# Patient Record
Sex: FEMALE | Race: WHITE | Employment: OTHER | URBAN - METROPOLITAN AREA
[De-identification: names, ages, dates, MRNs, and addresses within clinical notes are randomized per-mention and may not be internally consistent; named-entity substitution may affect disease eponyms.]

---

## 2017-03-01 PROCEDURE — 82746 ASSAY OF FOLIC ACID SERUM: CPT | Performed by: FAMILY MEDICINE

## 2017-03-01 PROCEDURE — 82607 VITAMIN B-12: CPT | Performed by: FAMILY MEDICINE

## 2017-04-17 PROBLEM — F32.9 REACTIVE DEPRESSION: Status: ACTIVE | Noted: 2017-04-17

## 2017-11-08 PROCEDURE — 82746 ASSAY OF FOLIC ACID SERUM: CPT | Performed by: FAMILY MEDICINE

## 2017-11-08 PROCEDURE — 82607 VITAMIN B-12: CPT | Performed by: FAMILY MEDICINE

## 2018-01-12 PROCEDURE — 82746 ASSAY OF FOLIC ACID SERUM: CPT | Performed by: FAMILY MEDICINE

## 2018-01-12 PROCEDURE — 82607 VITAMIN B-12: CPT | Performed by: FAMILY MEDICINE

## 2018-02-03 PROBLEM — D63.8 ANEMIA OF CHRONIC DISEASE: Status: ACTIVE | Noted: 2018-02-03

## 2018-02-24 ENCOUNTER — HOSPITAL ENCOUNTER (INPATIENT)
Facility: HOSPITAL | Age: 83
LOS: 2 days | Discharge: HOME | DRG: 309 | End: 2018-02-26
Attending: EMERGENCY MEDICINE | Admitting: HOSPITALIST
Payer: MEDICARE

## 2018-02-24 ENCOUNTER — HOSPITAL ENCOUNTER (OUTPATIENT)
Age: 83
Discharge: ANOTHER HOSPITAL | DRG: 309 | End: 2018-02-24
Attending: EMERGENCY MEDICINE
Payer: MEDICARE

## 2018-02-24 ENCOUNTER — APPOINTMENT (OUTPATIENT)
Dept: GENERAL RADIOLOGY | Age: 83
DRG: 309 | End: 2018-02-24
Attending: EMERGENCY MEDICINE
Payer: MEDICARE

## 2018-02-24 VITALS
RESPIRATION RATE: 28 BRPM | HEART RATE: 95 BPM | WEIGHT: 94 LBS | OXYGEN SATURATION: 100 % | DIASTOLIC BLOOD PRESSURE: 63 MMHG | HEIGHT: 63 IN | TEMPERATURE: 99 F | SYSTOLIC BLOOD PRESSURE: 128 MMHG | BODY MASS INDEX: 16.66 KG/M2

## 2018-02-24 DIAGNOSIS — R07.9 ACUTE CHEST PAIN: Primary | ICD-10-CM

## 2018-02-24 DIAGNOSIS — R53.1 WEAKNESS GENERALIZED: ICD-10-CM

## 2018-02-24 DIAGNOSIS — R94.31 ABNORMAL EKG: Primary | ICD-10-CM

## 2018-02-24 LAB
ADENOVIRUS PCR:: NEGATIVE
ALBUMIN SERPL BCP-MCNC: 3.6 G/DL (ref 3.5–4.8)
ALP SERPL-CCNC: 65 U/L (ref 32–100)
ALT SERPL-CCNC: 17 U/L (ref 14–54)
ANION GAP SERPL CALC-SCNC: 7 MMOL/L (ref 0–18)
AST SERPL-CCNC: 21 U/L (ref 15–41)
B PERT DNA SPEC QL NAA+PROBE: NEGATIVE
BASOPHILS # BLD: 0 K/UL (ref 0–0.2)
BASOPHILS NFR BLD: 0 %
BILIRUB DIRECT SERPL-MCNC: 0.1 MG/DL (ref 0–0.2)
BILIRUB SERPL-MCNC: 0.6 MG/DL (ref 0.3–1.2)
BILIRUB UR QL: NEGATIVE
BUN SERPL-MCNC: 14 MG/DL (ref 8–20)
BUN/CREAT SERPL: 20 (ref 10–20)
C PNEUM DNA SPEC QL NAA+PROBE: NEGATIVE
CALCIUM SERPL-MCNC: 9.1 MG/DL (ref 8.5–10.5)
CHLORIDE SERPL-SCNC: 104 MMOL/L (ref 95–110)
CHOLEST SERPL-MCNC: 117 MG/DL (ref 110–200)
CLARITY UR: CLEAR
CO2 SERPL-SCNC: 24 MMOL/L (ref 22–32)
COLOR UR: YELLOW
CORONAVIRUS 229E PCR:: NEGATIVE
CORONAVIRUS HKU1 PCR:: NEGATIVE
CORONAVIRUS NL63 PCR:: NEGATIVE
CORONAVIRUS OC43 PCR:: NEGATIVE
CREAT SERPL-MCNC: 0.7 MG/DL (ref 0.5–1.5)
EOSINOPHIL # BLD: 0 K/UL (ref 0–0.7)
EOSINOPHIL NFR BLD: 0 %
ERYTHROCYTE [DISTWIDTH] IN BLOOD BY AUTOMATED COUNT: 15.4 % (ref 11–15)
FLUAV + FLUBV RNA SPEC NAA+PROBE: NEGATIVE
FLUAV RNA SPEC QL NAA+PROBE: NEGATIVE
FLUBV RNA SPEC QL NAA+PROBE: NEGATIVE
GLUCOSE SERPL-MCNC: 103 MG/DL (ref 70–99)
GLUCOSE UR-MCNC: NEGATIVE MG/DL
HCT VFR BLD AUTO: 29.1 % (ref 35–48)
HDLC SERPL-MCNC: 59 MG/DL
HGB BLD-MCNC: 8.9 G/DL (ref 12–16)
HGB UR QL STRIP.AUTO: NEGATIVE
KETONES UR-MCNC: NEGATIVE MG/DL
LACTATE SERPL-SCNC: 1 MMOL/L (ref 0.5–2.2)
LDLC SERPL CALC-MCNC: 50 MG/DL (ref 0–99)
LYMPHOCYTES # BLD: 0.5 K/UL (ref 1–4)
LYMPHOCYTES NFR BLD: 4 %
MCH RBC QN AUTO: 27.8 PG (ref 27–32)
MCHC RBC AUTO-ENTMCNC: 30.5 G/DL (ref 32–37)
MCV RBC AUTO: 91.3 FL (ref 80–100)
METAPNEUMOVIRUS PCR:: NEGATIVE
MONOCYTES # BLD: 0.6 K/UL (ref 0–1)
MONOCYTES NFR BLD: 5 %
MYCOPLASMA PNEUMONIA PCR:: NEGATIVE
NEUTROPHILS # BLD AUTO: 11.7 K/UL (ref 1.8–7.7)
NEUTROPHILS NFR BLD: 91 %
NITRITE UR QL STRIP.AUTO: NEGATIVE
NONHDLC SERPL-MCNC: 58 MG/DL
OSMOLALITY UR CALC.SUM OF ELEC: 281 MOSM/KG (ref 275–295)
PARAINFLUENZA 1 PCR:: NEGATIVE
PARAINFLUENZA 2 PCR:: NEGATIVE
PARAINFLUENZA 3 PCR:: NEGATIVE
PARAINFLUENZA 4 PCR:: NEGATIVE
PH UR: 5 [PH] (ref 5–8)
PLATELET # BLD AUTO: 212 K/UL (ref 140–400)
PMV BLD AUTO: 8.1 FL (ref 7.4–10.3)
POTASSIUM SERPL-SCNC: 3.7 MMOL/L (ref 3.3–5.1)
PROCALCITONIN SERPL-MCNC: 0.07 NG/ML (ref ?–0.11)
PROT SERPL-MCNC: 6.7 G/DL (ref 5.9–8.4)
PROT UR-MCNC: NEGATIVE MG/DL
RBC # BLD AUTO: 3.19 M/UL (ref 3.7–5.4)
RBC #/AREA URNS AUTO: 1 /HPF
RHINOVIRUS/ENTERO PCR:: NEGATIVE
RSV RNA SPEC QL NAA+PROBE: NEGATIVE
SODIUM SERPL-SCNC: 135 MMOL/L (ref 136–144)
SP GR UR STRIP: 1.02 (ref 1–1.03)
TRIGL SERPL-MCNC: 38 MG/DL (ref 1–149)
TROPONIN I SERPL-MCNC: 0 NG/ML (ref ?–0.03)
TROPONIN I SERPL-MCNC: 0.01 NG/ML (ref ?–0.03)
TROPONIN I SERPL-MCNC: 0.05 NG/ML (ref ?–0.03)
UROBILINOGEN UR STRIP-ACNC: <2
VIT C UR-MCNC: NEGATIVE MG/DL
WBC # BLD AUTO: 12.8 K/UL (ref 4–11)
WBC #/AREA URNS AUTO: 4 /HPF

## 2018-02-24 PROCEDURE — 93005 ELECTROCARDIOGRAM TRACING: CPT

## 2018-02-24 PROCEDURE — 84484 ASSAY OF TROPONIN QUANT: CPT | Performed by: HOSPITALIST

## 2018-02-24 PROCEDURE — 99285 EMERGENCY DEPT VISIT HI MDM: CPT

## 2018-02-24 PROCEDURE — 87798 DETECT AGENT NOS DNA AMP: CPT | Performed by: EMERGENCY MEDICINE

## 2018-02-24 PROCEDURE — 87486 CHLMYD PNEUM DNA AMP PROBE: CPT | Performed by: EMERGENCY MEDICINE

## 2018-02-24 PROCEDURE — 99214 OFFICE O/P EST MOD 30 MIN: CPT

## 2018-02-24 PROCEDURE — 84484 ASSAY OF TROPONIN QUANT: CPT | Performed by: EMERGENCY MEDICINE

## 2018-02-24 PROCEDURE — 87040 BLOOD CULTURE FOR BACTERIA: CPT | Performed by: EMERGENCY MEDICINE

## 2018-02-24 PROCEDURE — 93010 ELECTROCARDIOGRAM REPORT: CPT | Performed by: EMERGENCY MEDICINE

## 2018-02-24 PROCEDURE — 96360 HYDRATION IV INFUSION INIT: CPT

## 2018-02-24 PROCEDURE — 93010 ELECTROCARDIOGRAM REPORT: CPT

## 2018-02-24 PROCEDURE — 84145 PROCALCITONIN (PCT): CPT | Performed by: HOSPITALIST

## 2018-02-24 PROCEDURE — 83605 ASSAY OF LACTIC ACID: CPT | Performed by: EMERGENCY MEDICINE

## 2018-02-24 PROCEDURE — 80048 BASIC METABOLIC PNL TOTAL CA: CPT | Performed by: EMERGENCY MEDICINE

## 2018-02-24 PROCEDURE — 94640 AIRWAY INHALATION TREATMENT: CPT

## 2018-02-24 PROCEDURE — 81001 URINALYSIS AUTO W/SCOPE: CPT | Performed by: EMERGENCY MEDICINE

## 2018-02-24 PROCEDURE — 87633 RESP VIRUS 12-25 TARGETS: CPT | Performed by: EMERGENCY MEDICINE

## 2018-02-24 PROCEDURE — A4216 STERILE WATER/SALINE, 10 ML: HCPCS

## 2018-02-24 PROCEDURE — 80061 LIPID PANEL: CPT | Performed by: EMERGENCY MEDICINE

## 2018-02-24 PROCEDURE — 80076 HEPATIC FUNCTION PANEL: CPT | Performed by: EMERGENCY MEDICINE

## 2018-02-24 PROCEDURE — 71046 X-RAY EXAM CHEST 2 VIEWS: CPT | Performed by: EMERGENCY MEDICINE

## 2018-02-24 PROCEDURE — 87581 M.PNEUMON DNA AMP PROBE: CPT | Performed by: EMERGENCY MEDICINE

## 2018-02-24 PROCEDURE — 85025 COMPLETE CBC W/AUTO DIFF WBC: CPT | Performed by: EMERGENCY MEDICINE

## 2018-02-24 PROCEDURE — 96361 HYDRATE IV INFUSION ADD-ON: CPT

## 2018-02-24 PROCEDURE — 36415 COLL VENOUS BLD VENIPUNCTURE: CPT

## 2018-02-24 PROCEDURE — 3E0F73Z INTRODUCTION OF ANTI-INFLAMMATORY INTO RESPIRATORY TRACT, VIA NATURAL OR ARTIFICIAL OPENING: ICD-10-PCS | Performed by: HOSPITALIST

## 2018-02-24 PROCEDURE — 87631 RESP VIRUS 3-5 TARGETS: CPT | Performed by: EMERGENCY MEDICINE

## 2018-02-24 RX ORDER — MULTIVIT-MIN/IRON/FOLIC ACID/K 18-600-40
1 CAPSULE ORAL DAILY
Status: ON HOLD | COMMUNITY
End: 2020-01-01

## 2018-02-24 RX ORDER — 0.9 % SODIUM CHLORIDE 0.9 %
VIAL (ML) INJECTION
Status: COMPLETED
Start: 2018-02-24 | End: 2018-02-24

## 2018-02-24 RX ORDER — MELATONIN
325 2 TIMES DAILY WITH MEALS
Status: DISCONTINUED | OUTPATIENT
Start: 2018-02-24 | End: 2018-02-26

## 2018-02-24 RX ORDER — ASPIRIN 81 MG/1
324 TABLET, CHEWABLE ORAL ONCE
Status: COMPLETED | OUTPATIENT
Start: 2018-02-24 | End: 2018-02-24

## 2018-02-24 RX ORDER — ARIPIPRAZOLE 15 MG/1
40 TABLET ORAL ONCE
Status: COMPLETED | OUTPATIENT
Start: 2018-02-24 | End: 2018-02-24

## 2018-02-24 RX ORDER — ACETAMINOPHEN 325 MG/1
650 TABLET ORAL EVERY 6 HOURS PRN
Status: DISCONTINUED | OUTPATIENT
Start: 2018-02-24 | End: 2018-02-26

## 2018-02-24 RX ORDER — GABAPENTIN 100 MG/1
100 CAPSULE ORAL 3 TIMES DAILY
Status: DISCONTINUED | OUTPATIENT
Start: 2018-02-24 | End: 2018-02-25

## 2018-02-24 RX ORDER — IPRATROPIUM BROMIDE AND ALBUTEROL SULFATE 2.5; .5 MG/3ML; MG/3ML
3 SOLUTION RESPIRATORY (INHALATION) 2 TIMES DAILY
Status: DISCONTINUED | OUTPATIENT
Start: 2018-02-24 | End: 2018-02-26

## 2018-02-24 RX ORDER — ONDANSETRON 2 MG/ML
4 INJECTION INTRAMUSCULAR; INTRAVENOUS EVERY 6 HOURS PRN
Status: DISCONTINUED | OUTPATIENT
Start: 2018-02-24 | End: 2018-02-26

## 2018-02-24 RX ORDER — ASPIRIN 81 MG/1
81 TABLET ORAL DAILY
Status: DISCONTINUED | OUTPATIENT
Start: 2018-02-25 | End: 2018-02-26

## 2018-02-24 RX ORDER — ENOXAPARIN SODIUM 100 MG/ML
40 INJECTION SUBCUTANEOUS DAILY
Status: DISCONTINUED | OUTPATIENT
Start: 2018-02-24 | End: 2018-02-26

## 2018-02-24 RX ORDER — SODIUM CHLORIDE 0.9 % (FLUSH) 0.9 %
3 SYRINGE (ML) INJECTION AS NEEDED
Status: DISCONTINUED | OUTPATIENT
Start: 2018-02-24 | End: 2018-02-26

## 2018-02-24 RX ORDER — LOSARTAN POTASSIUM 25 MG/1
25 TABLET ORAL DAILY
Status: DISCONTINUED | OUTPATIENT
Start: 2018-02-25 | End: 2018-02-26

## 2018-02-24 RX ORDER — ESCITALOPRAM OXALATE 10 MG/1
20 TABLET ORAL EVERY MORNING
Status: DISCONTINUED | OUTPATIENT
Start: 2018-02-24 | End: 2018-02-26

## 2018-02-24 RX ORDER — DILTIAZEM HYDROCHLORIDE 120 MG/1
120 CAPSULE, COATED, EXTENDED RELEASE ORAL DAILY
Status: DISCONTINUED | OUTPATIENT
Start: 2018-02-25 | End: 2018-02-26

## 2018-02-24 RX ORDER — ALBUTEROL SULFATE 2.5 MG/3ML
2.5 SOLUTION RESPIRATORY (INHALATION) EVERY 12 HOURS
COMMUNITY
End: 2018-07-16

## 2018-02-24 RX ORDER — ESCITALOPRAM OXALATE 20 MG/1
20 TABLET ORAL DAILY
COMMUNITY
End: 2018-12-12

## 2018-02-24 NOTE — ED PROVIDER NOTES
Patient Seen in: [address]    History   Patient presents with:  Dyspnea ELZBIETA SOB (respiratory)    Stated Complaint: difficulty breathing    HPI    The patient complains of shortness of breath which has been worsening in Nellie Jolley MD;  Location: [address] [place] [address]  10/3/2013: PATIENT Mulu Kramer PREOPERATIVE ORDER FOR IV ANT*      Comment: Procedure: COLONOSCOPY, POSSIBLE BIOPSY,                POSSIBLE POLYPECTOMY 81358;  Surgeon: Charlee Holden MD;  Beulah Mcfarland soft and nontender to palpation without organomegaly  Back is nontender to palpation  Extremities there is no edema or tenderness  Neurologic there are no gross cranial nerve deficits patient moves all 4 extremities without impairment    ICC  Course     La No definite acute airspace disease. BONES: No fracture or visible bony lesion. OTHER: Negative. Dictated by (CST): Mariaa Nichols MD on 2/24/2018 at 9:59     Approved by (CST): Mariaa Nichols MD on 2/24/2018 at 10:09          CONCLUSION:  1.  No acute air

## 2018-02-24 NOTE — ED PROVIDER NOTES
Patient Seen in: [facility] AND [facility] Department    History   Patient presents with:  Fever (infectious)    Stated Complaint:     HPI    [de-identified]year old female with pmh COPD, CAD, lung cancer S/P CyberKnife, hypertension, anemia, anxiety who presents POSSIBLE POLYPECTOMY 06650;  Surgeon: Indy Ramirez MD;  Location: [address] [place] [address]  10/3/2013: PATIENT Suzanne Bowling PREOPERATIVE ORDER FOR IV ANT*      Comment: Procedure: COLONOSCOPY, POSSIBLE BIOPSY,                POSSIBLE POLYPECT without pain. Neck supple. No neck rigidity. Normal range of motion present. Cardiovascular: Normal rate, regular rhythm, normal heart sounds and intact distal pulses. No murmur heard.   Pulmonary/Chest: Effort normal and breath sounds normal. No respi CBC W/ DIFFERENTIAL[[id number]]          Abnormal            Final result                 Please view results for these tests on the individual orders.    LIPID PANEL   LACTIC ACID, PLASMA   TROPONIN I   RAINBOW DRAW BLUE   RAINBOW DRAW LAVENDER   RAINBOW - D/w Dr Johnny Salas - coming to hospital to see patient.   Shortly after arrives, looks the EKGs, is able to find a comparison from an office visit and shows that the patient has history of left bundle branch block and this appears similar, is not worried for ST

## 2018-02-24 NOTE — CONSULTS
[facility]    PATIENT'S NAME: PORFIRIO SRINIVASAN   ATTENDING PHYSICIAN: Tad D. Juliana Blizzard, MD   CONSULTING PHYSICIAN: Anne Reed.  MD Suzanna   PATIENT ACCOUNT#:   [id number]    LOCATION:  [address] [place] [address] #:   [id number]       DATE OF BIRTH:  [birth year]. MEDICATIONS:  Prednisone, diltiazem, losartan, Lexapro, various inhalers, vitamin D, low-dose aspirin, gabapentin. ALLERGIES:  Sulfa drugs. SOCIAL HISTORY:  She is  with 2 healthy children. No alcohol.   Does drink caffeine every d 13:28:46  t: 02/24/2018 16:22:32  Job [id number]  MON/

## 2018-02-24 NOTE — H&P
[facility] Hospitalist H&P       CC: Patient presents with:  Fever (infectious)       PCP: Fransisco Barrientos MD    History of Present Illness: Patient is a [de-identified]year old female with PMH sig for COPD active smoker for 60 years, HTN, Lung CA treated with candy faust POLYPECTOMY 29790;  Surgeon: Lala Blakely MD;  Location: [address] [place] [address]  10/3/13=Normal: UPPER GI ENDOSCOPY PERFORMED      Comment: SB Bx normal  10/3/2013: UPPER GI ENDOSCOPY,BIOPSY      Comment: Procedure: Salvatore Pore For 56.00 Years     Types: Cigarettes    Smokeless tobacco: Never Used    Comment: since age 23    Alcohol use No        Fam Hx  Family History   Problem Relation Age of Onset   • Cancer Father      lung   • Hypertension Father    • Heart Disorder Father markings throughout the interstitium.  Dominant 1.4 x 2.2 cm in size pleural based density left upper lobe may suggest an unchanged pleural based mass previously noted in this region  on previous CT scanning, although there is no previous chest x-ray availa Further recommendations pending patient's clinical course.   [facility] hospitalist to continue to follow patient while in house    Patient and/or patient's family given opportunity to ask questions and note understanding and agreeing with therapeutic plan as

## 2018-02-24 NOTE — ED INITIAL ASSESSMENT (HPI)
Fever yesterday of 101F and \"feeling run down\" since yesterday. Is SOB, but has h/o COPD and is on 2L at home intermittently. Her O2 yesteday was 90% which is her average. She had her flu vaccine. Has sharp left rib pain since yesterday.  No cough or runn

## 2018-02-25 LAB
ALBUMIN SERPL BCP-MCNC: 2.9 G/DL (ref 3.5–4.8)
ALBUMIN/GLOB SERPL: 1.2 {RATIO} (ref 1–2)
ALP SERPL-CCNC: 51 U/L (ref 32–100)
ALT SERPL-CCNC: 12 U/L (ref 14–54)
ANION GAP SERPL CALC-SCNC: 9 MMOL/L (ref 0–18)
AST SERPL-CCNC: 14 U/L (ref 15–41)
BASOPHILS # BLD: 0 K/UL (ref 0–0.2)
BASOPHILS NFR BLD: 1 %
BILIRUB SERPL-MCNC: 0.5 MG/DL (ref 0.3–1.2)
BUN SERPL-MCNC: 11 MG/DL (ref 8–20)
BUN/CREAT SERPL: 16.2 (ref 10–20)
CALCIUM SERPL-MCNC: 9 MG/DL (ref 8.5–10.5)
CHLORIDE SERPL-SCNC: 106 MMOL/L (ref 95–110)
CO2 SERPL-SCNC: 23 MMOL/L (ref 22–32)
CREAT SERPL-MCNC: 0.68 MG/DL (ref 0.5–1.5)
EOSINOPHIL # BLD: 0 K/UL (ref 0–0.7)
EOSINOPHIL NFR BLD: 0 %
ERYTHROCYTE [DISTWIDTH] IN BLOOD BY AUTOMATED COUNT: 15.2 % (ref 11–15)
GLOBULIN PLAS-MCNC: 2.5 G/DL (ref 2.5–3.7)
GLUCOSE SERPL-MCNC: 90 MG/DL (ref 70–99)
HCT VFR BLD AUTO: 25.2 % (ref 35–48)
HCT VFR BLD AUTO: 25.6 % (ref 35–48)
HGB BLD-MCNC: 7.7 G/DL (ref 12–16)
HGB BLD-MCNC: 7.9 G/DL (ref 12–16)
LYMPHOCYTES # BLD: 0.6 K/UL (ref 1–4)
LYMPHOCYTES NFR BLD: 8 %
MAGNESIUM SERPL-MCNC: 2 MG/DL (ref 1.8–2.5)
MCH RBC QN AUTO: 28.3 PG (ref 27–32)
MCHC RBC AUTO-ENTMCNC: 30.7 G/DL (ref 32–37)
MCV RBC AUTO: 92.2 FL (ref 80–100)
MONOCYTES # BLD: 0.6 K/UL (ref 0–1)
MONOCYTES NFR BLD: 8 %
NEUTROPHILS # BLD AUTO: 6.2 K/UL (ref 1.8–7.7)
NEUTROPHILS NFR BLD: 83 %
OSMOLALITY UR CALC.SUM OF ELEC: 285 MOSM/KG (ref 275–295)
PLATELET # BLD AUTO: 177 K/UL (ref 140–400)
PMV BLD AUTO: 7.9 FL (ref 7.4–10.3)
POTASSIUM SERPL-SCNC: 4.5 MMOL/L (ref 3.3–5.1)
POTASSIUM SERPL-SCNC: 4.5 MMOL/L (ref 3.3–5.1)
PROT SERPL-MCNC: 5.4 G/DL (ref 5.9–8.4)
RBC # BLD AUTO: 2.73 M/UL (ref 3.7–5.4)
SODIUM SERPL-SCNC: 138 MMOL/L (ref 136–144)
WBC # BLD AUTO: 7.4 K/UL (ref 4–11)

## 2018-02-25 PROCEDURE — 94640 AIRWAY INHALATION TREATMENT: CPT

## 2018-02-25 PROCEDURE — 85014 HEMATOCRIT: CPT | Performed by: HOSPITALIST

## 2018-02-25 PROCEDURE — A4216 STERILE WATER/SALINE, 10 ML: HCPCS

## 2018-02-25 PROCEDURE — 83735 ASSAY OF MAGNESIUM: CPT | Performed by: HOSPITALIST

## 2018-02-25 PROCEDURE — 80053 COMPREHEN METABOLIC PANEL: CPT | Performed by: HOSPITALIST

## 2018-02-25 PROCEDURE — 84132 ASSAY OF SERUM POTASSIUM: CPT | Performed by: HOSPITALIST

## 2018-02-25 PROCEDURE — 85025 COMPLETE CBC W/AUTO DIFF WBC: CPT | Performed by: HOSPITALIST

## 2018-02-25 PROCEDURE — 85018 HEMOGLOBIN: CPT | Performed by: HOSPITALIST

## 2018-02-25 RX ORDER — 0.9 % SODIUM CHLORIDE 0.9 %
VIAL (ML) INJECTION
Status: COMPLETED
Start: 2018-02-25 | End: 2018-02-25

## 2018-02-25 NOTE — PROGRESS NOTES
[facility] Cardiology/[facility]  Progress Note    Shahla Fishman Patient Status:  Inpatient    1935 MRN [id number]   Location [facility] 3W/SW Attending Brittney Welch MD   Hosp Day # 1 PCP Aiden Tejada MD       ASSESSMENT:    1.    Basilia Stern rhonchi    Abdomen: Soft, non-tender. Extremities: Without clubbing, cyanosis or edema. Peripheral pulses present  Neurologic: Alert and oriented, normal affect. Skin: Warm and dry.    Psych: Appropriate      Laboratory/Data:  Diagnostics:     Stress Te

## 2018-02-25 NOTE — PROGRESS NOTES
[facility] Hospitalist Progress Note     CC: Hospital Follow up    PCP: Avi Andrews MD       Assessment/Plan:     Principal Problem:    Abnormal EKG  Active Problems:    Weakness generalized    Patient is a [de-identified]year old female with PMH sig for COPD active sm Art Castaneda MD    [facility] Hospitalist     Subjective:     No fevers no chills, no chest pain, no sob, no headaches, no nausea or vomiting, feels much better, no melena or hematochezia     OBJECTIVE:    Blood pressure 120/54, pulse 78, temperature 98.6 °F ( 2/24/2018  CONCLUSION:  1. No acute airspace disease. Advanced COPD changes. Prominent pulmonary markings throughout the interstitium.  Dominant 1.4 x 2.2 cm in size pleural based density left upper lobe may suggest an unchanged pleural based mass previousl

## 2018-02-26 ENCOUNTER — APPOINTMENT (OUTPATIENT)
Dept: CV DIAGNOSTICS | Facility: HOSPITAL | Age: 83
DRG: 309 | End: 2018-02-26
Attending: HOSPITALIST
Payer: MEDICARE

## 2018-02-26 ENCOUNTER — APPOINTMENT (OUTPATIENT)
Dept: NUCLEAR MEDICINE | Facility: HOSPITAL | Age: 83
DRG: 309 | End: 2018-02-26
Attending: HOSPITALIST
Payer: MEDICARE

## 2018-02-26 ENCOUNTER — APPOINTMENT (OUTPATIENT)
Dept: MRI IMAGING | Facility: HOSPITAL | Age: 83
DRG: 309 | End: 2018-02-26
Attending: HOSPITALIST
Payer: MEDICARE

## 2018-02-26 VITALS
RESPIRATION RATE: 16 BRPM | HEIGHT: 63 IN | SYSTOLIC BLOOD PRESSURE: 112 MMHG | BODY MASS INDEX: 16 KG/M2 | DIASTOLIC BLOOD PRESSURE: 59 MMHG | OXYGEN SATURATION: 95 % | TEMPERATURE: 99 F | HEART RATE: 71 BPM | WEIGHT: 90.31 LBS

## 2018-02-26 LAB
ANION GAP SERPL CALC-SCNC: 8 MMOL/L (ref 0–18)
BASOPHILS # BLD: 0 K/UL (ref 0–0.2)
BASOPHILS NFR BLD: 1 %
BUN SERPL-MCNC: 12 MG/DL (ref 8–20)
BUN/CREAT SERPL: 18.5 (ref 10–20)
CALCIUM SERPL-MCNC: 8.7 MG/DL (ref 8.5–10.5)
CHLORIDE SERPL-SCNC: 106 MMOL/L (ref 95–110)
CO2 SERPL-SCNC: 25 MMOL/L (ref 22–32)
CREAT SERPL-MCNC: 0.65 MG/DL (ref 0.5–1.5)
EOSINOPHIL # BLD: 0 K/UL (ref 0–0.7)
EOSINOPHIL NFR BLD: 1 %
ERYTHROCYTE [DISTWIDTH] IN BLOOD BY AUTOMATED COUNT: 15.2 % (ref 11–15)
GLUCOSE SERPL-MCNC: 98 MG/DL (ref 70–99)
HCT VFR BLD AUTO: 25.6 % (ref 35–48)
HGB BLD-MCNC: 8 G/DL (ref 12–16)
LYMPHOCYTES # BLD: 0.5 K/UL (ref 1–4)
LYMPHOCYTES NFR BLD: 11 %
MAGNESIUM SERPL-MCNC: 2 MG/DL (ref 1.8–2.5)
MCH RBC QN AUTO: 28.6 PG (ref 27–32)
MCHC RBC AUTO-ENTMCNC: 31.3 G/DL (ref 32–37)
MCV RBC AUTO: 91.3 FL (ref 80–100)
MONOCYTES # BLD: 0.5 K/UL (ref 0–1)
MONOCYTES NFR BLD: 9 %
NEUTROPHILS # BLD AUTO: 3.9 K/UL (ref 1.8–7.7)
NEUTROPHILS NFR BLD: 79 %
OSMOLALITY UR CALC.SUM OF ELEC: 288 MOSM/KG (ref 275–295)
PLATELET # BLD AUTO: 164 K/UL (ref 140–400)
PMV BLD AUTO: 7.6 FL (ref 7.4–10.3)
POTASSIUM SERPL-SCNC: 3.7 MMOL/L (ref 3.3–5.1)
RBC # BLD AUTO: 2.8 M/UL (ref 3.7–5.4)
SODIUM SERPL-SCNC: 139 MMOL/L (ref 136–144)
WBC # BLD AUTO: 5 K/UL (ref 4–11)

## 2018-02-26 PROCEDURE — 70553 MRI BRAIN STEM W/O & W/DYE: CPT | Performed by: HOSPITALIST

## 2018-02-26 PROCEDURE — 83735 ASSAY OF MAGNESIUM: CPT | Performed by: HOSPITALIST

## 2018-02-26 PROCEDURE — A4216 STERILE WATER/SALINE, 10 ML: HCPCS | Performed by: HOSPITALIST

## 2018-02-26 PROCEDURE — 94640 AIRWAY INHALATION TREATMENT: CPT

## 2018-02-26 PROCEDURE — 93018 CV STRESS TEST I&R ONLY: CPT | Performed by: HOSPITALIST

## 2018-02-26 PROCEDURE — 78452 HT MUSCLE IMAGE SPECT MULT: CPT | Performed by: HOSPITALIST

## 2018-02-26 PROCEDURE — 85025 COMPLETE CBC W/AUTO DIFF WBC: CPT | Performed by: HOSPITALIST

## 2018-02-26 PROCEDURE — 93016 CV STRESS TEST SUPVJ ONLY: CPT | Performed by: HOSPITALIST

## 2018-02-26 PROCEDURE — 93017 CV STRESS TEST TRACING ONLY: CPT | Performed by: HOSPITALIST

## 2018-02-26 PROCEDURE — 80048 BASIC METABOLIC PNL TOTAL CA: CPT | Performed by: HOSPITALIST

## 2018-02-26 PROCEDURE — 4A02XM4 MEASUREMENT OF CARDIAC TOTAL ACTIVITY, EXTERNAL APPROACH: ICD-10-PCS | Performed by: INTERNAL MEDICINE

## 2018-02-26 PROCEDURE — A9575 INJ GADOTERATE MEGLUMI 0.1ML: HCPCS | Performed by: HOSPITALIST

## 2018-02-26 PROCEDURE — A4216 STERILE WATER/SALINE, 10 ML: HCPCS

## 2018-02-26 PROCEDURE — 3E033HZ INTRODUCTION OF RADIOACTIVE SUBSTANCE INTO PERIPHERAL VEIN, PERCUTANEOUS APPROACH: ICD-10-PCS | Performed by: INTERNAL MEDICINE

## 2018-02-26 RX ORDER — 0.9 % SODIUM CHLORIDE 0.9 %
VIAL (ML) INJECTION
Status: COMPLETED
Start: 2018-02-26 | End: 2018-02-26

## 2018-02-26 RX ORDER — POTASSIUM CHLORIDE 20 MEQ/1
40 TABLET, EXTENDED RELEASE ORAL ONCE
Status: COMPLETED | OUTPATIENT
Start: 2018-02-26 | End: 2018-02-26

## 2018-02-26 NOTE — DISCHARGE SUMMARY
General Medicine Discharge Summary     Patient ID:  Katey Maguire  [de-identified]year old  12/26/1935    Admit date: 2/24/2018    Discharge date and time: No discharge date for patient encounter.  2/26/18    Attending Physician: Irma Chapraro MD     Consults: AN CONSUL fever and abnormal ECG.   Patient was ruled out for MI, enzymes were negative, but ECG with LBBB, Cards recommended stress test which was normal.  Patient also with fever and fatigue, fever work up negative for infectious etiology, CXR without PNA, RVP nega and video capsule, but patient declining this, rather would just 'take the tabs and forget about it'  - repeat CBC in 4 days with PCP  - if worse then will need GI eval, patient and family aware      Abnormal CXR  - findings similar to prior pleural base l albuterol sulfate (2.5 MG/3ML) 0.083% Nebu  Commonly known as:  VENTOLIN     ASPIRIN LOW DOSE OR     Budesonide-Formoterol Fumarate 160-4.5 MCG/ACT Aero  Commonly known as:  SYMBICORT     DilTIAZem HCl  MG Cp24  Commonly known as:  DILT-XR  TAKE 1 COMPLEX OR     Vitamin D 2000 units Caps                    Total Time Coordinating Care: Greater than 30 minutes    Patient had opportunity to ask questions and state understand and agree with therapeutic plan as outlined    Thank Andres HICKS

## 2018-02-26 NOTE — PLAN OF CARE
Problem: SAFETY ADULT - FALL  Goal: Free from fall injury  INTERVENTIONS:  - Assess pt frequently for physical needs  - Identify cognitive and physical deficits and behaviors that affect risk of falls.   - [place] fall precautions as indicated by assessme

## 2018-02-26 NOTE — PROGRESS NOTES
[facility] Cardiology/[facility]  Progress Note    Chris Flattery Patient Status:  Inpatient    1935 MRN [id number]   Location [facility] 3W/SW Attending Vira Rodriguez MD   Hosp Day # 2 PCP Shahnaz Faulkner MD       ASSESSMENT:    1.    Lorelei Websterer rhonchi    Abdomen: Soft, non-tender. Extremities: Without clubbing, cyanosis or edema. Peripheral pulses present  Neurologic: Alert and oriented, normal affect. Skin: Warm and dry.    Psych: Appropriate      Laboratory/Data:  Diagnostics:   +9  Stress

## 2018-02-26 NOTE — PROGRESS NOTES
[facility] Hospitalist Progress Note     CC: Hospital Follow up    PCP: Brittanie Toney MD       Assessment/Plan:     Principal Problem:    Abnormal EKG  Active Problems:    Weakness generalized    Patient is a [de-identified]year old female with PMH sig for COPD active sm Diet: gen diet     DVT Prophy:  Scd, lovenox  Atrophy: IS  Lines: PIV     Dispo: pending clinical course    Questions/concerns were discussed with patient and/or family by bedside.     Thank Fortunato Presley MD    [facility] Hospitalist     Subjective:     Low grad --   31.3*   RDW  15.4*  15.2*   --   15.2*   WBC  12.8*  7.4   --   5.0   PLT  212  177   --   164         Recent Labs   Lab  02/24/18   1125  02/25/18   0554  02/26/18   0524   GLU  103*  90  98   BUN  14  11  12   CREATSERUM  0.70  0.68  0.65   GFRAA

## 2018-03-02 PROBLEM — I44.7 LBBB (LEFT BUNDLE BRANCH BLOCK): Status: ACTIVE | Noted: 2018-03-02

## 2018-03-15 PROBLEM — M25.511 CHRONIC RIGHT SHOULDER PAIN: Status: ACTIVE | Noted: 2018-03-15

## 2018-03-15 PROBLEM — G56.03 BILATERAL CARPAL TUNNEL SYNDROME: Status: ACTIVE | Noted: 2018-03-15

## 2018-03-15 PROBLEM — G89.29 CHRONIC RIGHT SHOULDER PAIN: Status: ACTIVE | Noted: 2018-03-15

## 2018-05-17 PROBLEM — IMO0002 DISORDER OF ROTATOR CUFF SYNDROME OF RIGHT SHOULDER AND ALLIED DISORDER: Status: ACTIVE | Noted: 2018-05-17

## 2018-05-17 PROBLEM — G56.02 LEFT CARPAL TUNNEL SYNDROME: Status: ACTIVE | Noted: 2018-05-17

## 2018-05-17 PROBLEM — G56.01 RIGHT CARPAL TUNNEL SYNDROME: Status: ACTIVE | Noted: 2018-03-15

## 2018-05-17 PROBLEM — M19.011 PRIMARY OSTEOARTHRITIS OF RIGHT SHOULDER: Status: ACTIVE | Noted: 2018-05-17

## 2018-06-27 PROBLEM — M25.511 ACUTE PAIN OF RIGHT SHOULDER: Status: ACTIVE | Noted: 2018-06-27

## 2018-08-01 ENCOUNTER — APPOINTMENT (OUTPATIENT)
Dept: RADIATION ONCOLOGY | Facility: HOSPITAL | Age: 83
End: 2018-08-01
Attending: RADIOLOGY
Payer: MEDICARE

## 2018-08-15 ENCOUNTER — OFFICE VISIT (OUTPATIENT)
Dept: RADIATION ONCOLOGY | Facility: HOSPITAL | Age: 83
End: 2018-08-15
Attending: RADIOLOGY
Payer: MEDICARE

## 2018-08-15 VITALS
BODY MASS INDEX: 16 KG/M2 | OXYGEN SATURATION: 97 % | HEART RATE: 74 BPM | TEMPERATURE: 99 F | SYSTOLIC BLOOD PRESSURE: 133 MMHG | WEIGHT: 86.63 LBS | RESPIRATION RATE: 18 BRPM | DIASTOLIC BLOOD PRESSURE: 55 MMHG

## 2018-08-15 DIAGNOSIS — C34.90 LUNG CANCER (HCC): Primary | ICD-10-CM

## 2018-08-15 DIAGNOSIS — C34.02 MALIGNANT NEOPLASM OF HILUS OF LEFT LUNG (HCC): ICD-10-CM

## 2018-08-15 PROCEDURE — 99212 OFFICE O/P EST SF 10 MIN: CPT

## 2018-08-15 RX ORDER — ACETAMINOPHEN 500 MG
500 TABLET ORAL EVERY 6 HOURS PRN
Status: ON HOLD | COMMUNITY
End: 2020-01-01

## 2018-08-15 NOTE — PROGRESS NOTES
Nursing Consultation Note  Patient: Alex Montano  YOB: 1935  Age: [de-identified]year old  Radiation Oncologist: Dr. Camille Tinoco  Referring Physician: Marquis Jones  Diagnosis:No diagnosis found.   Consult Date: 8/15/2018      Chemotherapy: no  La Nebu Soln Take 3 mL (2.5 mg total) by nebulization every 12 (twelve) hours. Disp: 3 Box Rfl: 3   Cholecalciferol (VITAMIN D) 2000 units Oral Cap Take 1 capsule by mouth daily. Disp:  Rfl:    escitalopram 20 MG Oral Tab Take 20 mg by mouth daily.  Disp:  Rfl HISTORY      Comment: lung CA - cyberknife; Dr Lugo  Matias  10/3/2013: PATIENT DOCUMENTED NOT TO HAVE EXPERIENCED ANY*      Comment: Procedure: COLONOSCOPY, POSSIBLE BIOPSY,                POSSIBLE POLYPECTOMY 95393;  Surgeon: Nicholas Wilhelm MD Increased fatigue level  Possible depression  Disease process    Interventions:  Monitor and trend weight  Instruct patient on importance of caloric intake during treatment  Instruct regarding small frequent meals  Eat foods high in calories and protein

## 2018-08-16 NOTE — PROGRESS NOTES
RADIATION ONCOLOGY NOTE    DATE OF VISIT: 8/15/2018    DIAGNOSIS: Stage IA, T1a N0 M0, adenocarcinoma of the left lung, status post definitive CyberKnife radiosurgery back in 2012, with new PET positive RUL nodule, suspicious malignancy, with smoking depen Musculoskeletal: Positive for arthralgias. Carpal tunnel   Skin: Negative. Allergic/Immunologic: Negative. Neurological: Negative. Hematological: Negative. Psychiatric/Behavioral: Negative.             Current Outpatient Prescriptions: COLD/FLU; COPD; Crohn's disease ([facility] [address] Diabetes mellitus ([facility] [address] Difficult intubation; History of chest pain; Hypercholesterolemia; Malignant hyperthermia; MITRAL VALVE PROLAPSE; Myocardial infarction ([facility] [address]  Other specified diseases of blood and blood-formi temperature source Oral, resp. rate 18, weight 39.3 kg (86 lb 9.6 oz), SpO2 97 %. PERFORMANCE STATUS 2  ,  Denies PAIN  GENERAL:  Pt is alert and oriented times three in no acute distress, thin emaciated. [facility]     HEENT:  PERRLA, EOMI, oropharynx is clear wit calories with supplements and to also she will f/u with psychiatry and neurology. She was also advised home health and PT and we will have her f/u with us after a 3 month CT scan. Thank you for allowing me to take care of your patient.   Please do n There is a spiculated nodule in the right upper lobe,  measuring 0.7 x 0.8 cm, with an SUV max of 16.4.  There is a larger nodule within the left upper  lobe, measuring 2 x 1.6 cm with an SUV max of 2.0.  ABDOMEN AND PELVIS:  FDG distribution in the abdomen parotid tails. There is a mass within the left  parotid tail measuring 1.9 x 1.3 cm with an SUV max of 14.2. There are 2 separate masses within the  right parotid tail, the larger mass measuring 1.2 x 1.2 cm with an SUV max of 7.8.  No hypermetabolic  cervi

## 2018-10-16 ENCOUNTER — TELEPHONE (OUTPATIENT)
Dept: RADIATION ONCOLOGY | Facility: HOSPITAL | Age: 83
End: 2018-10-16

## 2018-10-24 ENCOUNTER — HOSPITAL ENCOUNTER (OUTPATIENT)
Dept: CT IMAGING | Facility: HOSPITAL | Age: 83
Discharge: HOME | End: 2018-10-24
Attending: RADIOLOGY
Payer: MEDICARE

## 2018-10-24 DIAGNOSIS — C34.90 LUNG CANCER (HCC): ICD-10-CM

## 2018-10-24 PROCEDURE — 71250 CT THORAX DX C-: CPT | Performed by: RADIOLOGY

## 2018-10-29 ENCOUNTER — TELEPHONE (OUTPATIENT)
Dept: HEMATOLOGY/ONCOLOGY | Facility: HOSPITAL | Age: 83
End: 2018-10-29

## 2018-10-29 DIAGNOSIS — C34.90 LUNG CANCER (HCC): Primary | ICD-10-CM

## 2018-10-29 NOTE — TELEPHONE ENCOUNTER
Brooklyn would like to speak with the nurse concerning Carmen's test results from her CT scan.  Brooklyn can be reached at [phone] Please Advise thanks

## 2018-12-12 PROBLEM — M75.81 RIGHT ROTATOR CUFF TENDONITIS: Status: ACTIVE | Noted: 2018-12-12

## 2018-12-13 PROBLEM — R20.0 NUMBNESS: Status: ACTIVE | Noted: 2018-12-13

## 2018-12-13 PROBLEM — M48.02 CERVICAL SPINAL STENOSIS: Status: ACTIVE | Noted: 2018-12-13

## 2019-01-01 ENCOUNTER — HOSPITAL ENCOUNTER (OUTPATIENT)
Dept: CT IMAGING | Facility: HOSPITAL | Age: 84
Discharge: HOME | End: 2019-01-01
Attending: RADIOLOGY
Payer: MEDICARE

## 2019-01-01 ENCOUNTER — TELEPHONE (OUTPATIENT)
Dept: RADIATION ONCOLOGY | Facility: HOSPITAL | Age: 84
End: 2019-01-01

## 2019-01-01 ENCOUNTER — NURSE ONLY (OUTPATIENT)
Dept: HEMATOLOGY/ONCOLOGY | Facility: HOSPITAL | Age: 84
End: 2019-01-01
Attending: INTERNAL MEDICINE
Payer: MEDICARE

## 2019-01-01 ENCOUNTER — DOCUMENTATION ONLY (OUTPATIENT)
Dept: RADIATION ONCOLOGY | Facility: HOSPITAL | Age: 84
End: 2019-01-01

## 2019-01-01 ENCOUNTER — TELEPHONE (OUTPATIENT)
Dept: HEMATOLOGY/ONCOLOGY | Facility: HOSPITAL | Age: 84
End: 2019-01-01

## 2019-01-01 ENCOUNTER — OFFICE VISIT (OUTPATIENT)
Dept: RADIATION ONCOLOGY | Facility: HOSPITAL | Age: 84
End: 2019-01-01
Attending: RADIOLOGY
Payer: MEDICARE

## 2019-01-01 ENCOUNTER — TELEPHONE (OUTPATIENT)
Dept: NEUROLOGY | Facility: CLINIC | Age: 84
End: 2019-01-01

## 2019-01-01 ENCOUNTER — HOSPITAL ENCOUNTER (OUTPATIENT)
Dept: GENERAL RADIOLOGY | Facility: HOSPITAL | Age: 84
Discharge: HOME | End: 2019-01-01
Attending: RADIOLOGY
Payer: MEDICARE

## 2019-01-01 ENCOUNTER — HOSPITAL ENCOUNTER (OUTPATIENT)
Dept: MRI IMAGING | Facility: HOSPITAL | Age: 84
Discharge: HOME | End: 2019-01-01
Attending: RADIOLOGY
Payer: MEDICARE

## 2019-01-01 ENCOUNTER — DIETICIAN VISIT (OUTPATIENT)
Dept: NUTRITION | Facility: HOSPITAL | Age: 84
End: 2019-01-01

## 2019-01-01 ENCOUNTER — DOCUMENTATION ONLY (OUTPATIENT)
Dept: HEMATOLOGY/ONCOLOGY | Facility: HOSPITAL | Age: 84
End: 2019-01-01

## 2019-01-01 ENCOUNTER — OFFICE VISIT (OUTPATIENT)
Dept: RADIATION ONCOLOGY | Facility: HOSPITAL | Age: 84
End: 2019-01-01
Attending: INTERNAL MEDICINE
Payer: MEDICARE

## 2019-01-01 ENCOUNTER — HOSPITAL ENCOUNTER (INPATIENT)
Facility: HOSPITAL | Age: 84
LOS: 3 days | Discharge: HOME | DRG: 811 | End: 2019-01-01
Attending: EMERGENCY MEDICINE | Admitting: HOSPITALIST
Payer: MEDICARE

## 2019-01-01 ENCOUNTER — OFFICE VISIT (OUTPATIENT)
Dept: SURGERY | Facility: CLINIC | Age: 84
End: 2019-01-01
Payer: MEDICARE

## 2019-01-01 ENCOUNTER — OFFICE VISIT (OUTPATIENT)
Dept: NEUROLOGY | Facility: CLINIC | Age: 84
End: 2019-01-01
Payer: MEDICARE

## 2019-01-01 ENCOUNTER — ANESTHESIA EVENT (OUTPATIENT)
Dept: ENDOSCOPY | Facility: HOSPITAL | Age: 84
DRG: 811 | End: 2019-01-01
Payer: MEDICARE

## 2019-01-01 ENCOUNTER — HOSPITAL ENCOUNTER (OUTPATIENT)
Dept: GENERAL RADIOLOGY | Facility: HOSPITAL | Age: 84
End: 2019-01-01
Attending: RADIOLOGY
Payer: MEDICARE

## 2019-01-01 ENCOUNTER — ANESTHESIA (OUTPATIENT)
Dept: ENDOSCOPY | Facility: HOSPITAL | Age: 84
DRG: 811 | End: 2019-01-01
Payer: MEDICARE

## 2019-01-01 ENCOUNTER — HOSPITAL ENCOUNTER (OUTPATIENT)
Dept: NUCLEAR MEDICINE | Facility: HOSPITAL | Age: 84
Discharge: HOME | End: 2019-01-01
Attending: RADIOLOGY
Payer: MEDICARE

## 2019-01-01 ENCOUNTER — APPOINTMENT (OUTPATIENT)
Dept: GENERAL RADIOLOGY | Facility: HOSPITAL | Age: 84
DRG: 811 | End: 2019-01-01
Attending: EMERGENCY MEDICINE
Payer: MEDICARE

## 2019-01-01 ENCOUNTER — LAB ENCOUNTER (OUTPATIENT)
Dept: LAB | Facility: HOSPITAL | Age: 84
End: 2019-01-01
Attending: NURSE PRACTITIONER
Payer: MEDICARE

## 2019-01-01 VITALS
RESPIRATION RATE: 16 BRPM | WEIGHT: 86 LBS | TEMPERATURE: 98 F | SYSTOLIC BLOOD PRESSURE: 121 MMHG | SYSTOLIC BLOOD PRESSURE: 107 MMHG | RESPIRATION RATE: 17 BRPM | BODY MASS INDEX: 15.83 KG/M2 | WEIGHT: 83.63 LBS | OXYGEN SATURATION: 93 % | HEART RATE: 79 BPM | HEART RATE: 76 BPM | DIASTOLIC BLOOD PRESSURE: 52 MMHG | BODY MASS INDEX: 15 KG/M2 | HEIGHT: 62 IN | DIASTOLIC BLOOD PRESSURE: 68 MMHG

## 2019-01-01 VITALS
RESPIRATION RATE: 16 BRPM | DIASTOLIC BLOOD PRESSURE: 56 MMHG | BODY MASS INDEX: 15.76 KG/M2 | HEIGHT: 62 IN | HEART RATE: 74 BPM | SYSTOLIC BLOOD PRESSURE: 138 MMHG | WEIGHT: 85.63 LBS | TEMPERATURE: 98 F | OXYGEN SATURATION: 90 %

## 2019-01-01 VITALS
OXYGEN SATURATION: 97 % | HEART RATE: 66 BPM | WEIGHT: 91.38 LBS | TEMPERATURE: 98 F | SYSTOLIC BLOOD PRESSURE: 165 MMHG | RESPIRATION RATE: 18 BRPM | BODY MASS INDEX: 17 KG/M2 | DIASTOLIC BLOOD PRESSURE: 71 MMHG

## 2019-01-01 VITALS — BODY MASS INDEX: 15 KG/M2 | WEIGHT: 84 LBS

## 2019-01-01 VITALS
HEART RATE: 73 BPM | HEIGHT: 62 IN | WEIGHT: 82 LBS | SYSTOLIC BLOOD PRESSURE: 131 MMHG | TEMPERATURE: 98 F | OXYGEN SATURATION: 93 % | RESPIRATION RATE: 16 BRPM | BODY MASS INDEX: 15.09 KG/M2 | DIASTOLIC BLOOD PRESSURE: 70 MMHG

## 2019-01-01 VITALS
TEMPERATURE: 98 F | BODY MASS INDEX: 15 KG/M2 | RESPIRATION RATE: 16 BRPM | OXYGEN SATURATION: 92 % | HEART RATE: 73 BPM | WEIGHT: 84.63 LBS | DIASTOLIC BLOOD PRESSURE: 55 MMHG | SYSTOLIC BLOOD PRESSURE: 133 MMHG

## 2019-01-01 VITALS
RESPIRATION RATE: 16 BRPM | DIASTOLIC BLOOD PRESSURE: 66 MMHG | OXYGEN SATURATION: 92 % | HEART RATE: 77 BPM | BODY MASS INDEX: 15.76 KG/M2 | WEIGHT: 85.63 LBS | SYSTOLIC BLOOD PRESSURE: 137 MMHG | HEIGHT: 62 IN | TEMPERATURE: 98 F

## 2019-01-01 VITALS
BODY MASS INDEX: 15.83 KG/M2 | HEART RATE: 76 BPM | RESPIRATION RATE: 14 BRPM | WEIGHT: 86 LBS | DIASTOLIC BLOOD PRESSURE: 58 MMHG | SYSTOLIC BLOOD PRESSURE: 108 MMHG | HEIGHT: 62 IN

## 2019-01-01 VITALS
OXYGEN SATURATION: 92 % | TEMPERATURE: 98 F | BODY MASS INDEX: 15.76 KG/M2 | SYSTOLIC BLOOD PRESSURE: 137 MMHG | HEIGHT: 62 IN | HEART RATE: 77 BPM | DIASTOLIC BLOOD PRESSURE: 66 MMHG | RESPIRATION RATE: 16 BRPM | WEIGHT: 85.63 LBS

## 2019-01-01 VITALS
TEMPERATURE: 98 F | WEIGHT: 85.19 LBS | DIASTOLIC BLOOD PRESSURE: 74 MMHG | SYSTOLIC BLOOD PRESSURE: 114 MMHG | OXYGEN SATURATION: 83 % | RESPIRATION RATE: 16 BRPM | BODY MASS INDEX: 16 KG/M2 | HEART RATE: 76 BPM

## 2019-01-01 VITALS
HEIGHT: 62 IN | OXYGEN SATURATION: 92 % | HEART RATE: 77 BPM | BODY MASS INDEX: 15.53 KG/M2 | WEIGHT: 84.38 LBS | RESPIRATION RATE: 30 BRPM | DIASTOLIC BLOOD PRESSURE: 83 MMHG | SYSTOLIC BLOOD PRESSURE: 141 MMHG

## 2019-01-01 VITALS
HEIGHT: 62 IN | OXYGEN SATURATION: 93 % | WEIGHT: 85 LBS | RESPIRATION RATE: 16 BRPM | SYSTOLIC BLOOD PRESSURE: 109 MMHG | BODY MASS INDEX: 15.64 KG/M2 | DIASTOLIC BLOOD PRESSURE: 87 MMHG | TEMPERATURE: 98 F | HEART RATE: 75 BPM

## 2019-01-01 VITALS
OXYGEN SATURATION: 97 % | TEMPERATURE: 98 F | BODY MASS INDEX: 15.77 KG/M2 | SYSTOLIC BLOOD PRESSURE: 129 MMHG | DIASTOLIC BLOOD PRESSURE: 56 MMHG | RESPIRATION RATE: 18 BRPM | HEART RATE: 64 BPM | WEIGHT: 89 LBS | HEIGHT: 63 IN

## 2019-01-01 DIAGNOSIS — C34.90 LUNG CANCER (HCC): ICD-10-CM

## 2019-01-01 DIAGNOSIS — M54.12 CERVICAL RADICULOPATHY: ICD-10-CM

## 2019-01-01 DIAGNOSIS — C34.92 SMALL CELL LUNG CANCER, LEFT (HCC): Primary | ICD-10-CM

## 2019-01-01 DIAGNOSIS — F17.200 SMOKING ADDICTION: ICD-10-CM

## 2019-01-01 DIAGNOSIS — C34.12 MALIGNANT NEOPLASM OF UPPER LOBE OF LEFT LUNG (HCC): ICD-10-CM

## 2019-01-01 DIAGNOSIS — F32.9 REACTIVE DEPRESSION: ICD-10-CM

## 2019-01-01 DIAGNOSIS — D64.9 ANEMIA, UNSPECIFIED TYPE: Primary | ICD-10-CM

## 2019-01-01 DIAGNOSIS — M48.02 CERVICAL SPINAL STENOSIS: ICD-10-CM

## 2019-01-01 DIAGNOSIS — M50.90 CERVICAL DISC DISEASE: ICD-10-CM

## 2019-01-01 DIAGNOSIS — M48.02 CERVICAL STENOSIS OF SPINE: ICD-10-CM

## 2019-01-01 DIAGNOSIS — J42 CHRONIC BRONCHITIS, UNSPECIFIED CHRONIC BRONCHITIS TYPE (HCC): ICD-10-CM

## 2019-01-01 DIAGNOSIS — Z72.0 TOBACCO ABUSE DISORDER: ICD-10-CM

## 2019-01-01 DIAGNOSIS — R53.83 FATIGUE: ICD-10-CM

## 2019-01-01 DIAGNOSIS — D64.9 ANEMIA, UNSPECIFIED TYPE: ICD-10-CM

## 2019-01-01 DIAGNOSIS — Z51.11 CHEMOTHERAPY MANAGEMENT, ENCOUNTER FOR: ICD-10-CM

## 2019-01-01 DIAGNOSIS — M50.20 CERVICAL HERNIATED DISC: ICD-10-CM

## 2019-01-01 DIAGNOSIS — C34.90 LUNG CANCER (HCC): Primary | ICD-10-CM

## 2019-01-01 DIAGNOSIS — D50.9 IRON DEFICIENCY ANEMIA, UNSPECIFIED IRON DEFICIENCY ANEMIA TYPE: ICD-10-CM

## 2019-01-01 DIAGNOSIS — G56.02 LEFT CARPAL TUNNEL SYNDROME: ICD-10-CM

## 2019-01-01 DIAGNOSIS — C34.02 MALIGNANT NEOPLASM OF HILUS OF LEFT LUNG (HCC): ICD-10-CM

## 2019-01-01 DIAGNOSIS — C34.92 SMALL CELL LUNG CANCER, LEFT (HCC): ICD-10-CM

## 2019-01-01 DIAGNOSIS — M48.02 CERVICAL STENOSIS OF SPINE: Primary | ICD-10-CM

## 2019-01-01 DIAGNOSIS — M54.12 CERVICAL RADICULOPATHY: Primary | ICD-10-CM

## 2019-01-01 DIAGNOSIS — M67.911 DYSFUNCTION OF RIGHT ROTATOR CUFF: ICD-10-CM

## 2019-01-01 DIAGNOSIS — M77.8 RIGHT SHOULDER TENDONITIS: ICD-10-CM

## 2019-01-01 DIAGNOSIS — R91.1 LESION OF RIGHT LUNG: ICD-10-CM

## 2019-01-01 DIAGNOSIS — M75.01 ADHESIVE CAPSULITIS OF RIGHT SHOULDER: ICD-10-CM

## 2019-01-01 DIAGNOSIS — R53.83 OTHER FATIGUE: ICD-10-CM

## 2019-01-01 DIAGNOSIS — C34.92 ADENOCARCINOMA OF LEFT LUNG, STAGE 1 (HCC): ICD-10-CM

## 2019-01-01 DIAGNOSIS — C34.90 SMALL CELL LUNG CANCER (HCC): Primary | ICD-10-CM

## 2019-01-01 DIAGNOSIS — M25.511 ACUTE PAIN OF RIGHT SHOULDER: ICD-10-CM

## 2019-01-01 DIAGNOSIS — G56.01 RIGHT CARPAL TUNNEL SYNDROME: ICD-10-CM

## 2019-01-01 DIAGNOSIS — D50.0 IRON DEFICIENCY ANEMIA DUE TO CHRONIC BLOOD LOSS: ICD-10-CM

## 2019-01-01 LAB
CREAT BLD-MCNC: 0.8 MG/DL (ref 0.55–1.02)
DEPRECATED RDW RBC AUTO: 53.8 FL (ref 35.1–46.3)
ERYTHROCYTE [DISTWIDTH] IN BLOOD BY AUTOMATED COUNT: 15.3 % (ref 11–15)
GLUCOSE BLDC GLUCOMTR-MCNC: 86 MG/DL (ref 70–99)
HCT VFR BLD AUTO: 39.4 % (ref 35–48)
HGB BLD-MCNC: 12.3 G/DL (ref 12–16)
MCH RBC QN AUTO: 29.6 PG (ref 26–34)
MCHC RBC AUTO-ENTMCNC: 31.2 G/DL (ref 31–37)
MCV RBC AUTO: 94.7 FL (ref 80–100)
PLATELET # BLD AUTO: 154 10(3)UL (ref 150–450)
RBC # BLD AUTO: 4.16 X10(6)UL (ref 3.8–5.3)
WBC # BLD AUTO: 5.2 X10(3) UL (ref 4–11)

## 2019-01-01 PROCEDURE — 77412 RADIATION TX DELIVERY LVL 3: CPT | Performed by: RADIOLOGY

## 2019-01-01 PROCEDURE — 32405 CT BIOPSY LUNG OR MEDIASTINUM (CPT=77012/32405): CPT | Performed by: RADIOLOGY

## 2019-01-01 PROCEDURE — 99215 OFFICE O/P EST HI 40 MIN: CPT | Performed by: INTERNAL MEDICINE

## 2019-01-01 PROCEDURE — 84466 ASSAY OF TRANSFERRIN: CPT

## 2019-01-01 PROCEDURE — 77387 GUIDANCE FOR RADJ TX DLVR: CPT | Performed by: RADIOLOGY

## 2019-01-01 PROCEDURE — 77334 RADIATION TREATMENT AID(S): CPT | Performed by: RADIOLOGY

## 2019-01-01 PROCEDURE — 80053 COMPREHEN METABOLIC PANEL: CPT

## 2019-01-01 PROCEDURE — 30233N1 TRANSFUSION OF NONAUTOLOGOUS RED BLOOD CELLS INTO PERIPHERAL VEIN, PERCUTANEOUS APPROACH: ICD-10-PCS | Performed by: HOSPITALIST

## 2019-01-01 PROCEDURE — 77293 RESPIRATOR MOTION MGMT SIMUL: CPT | Performed by: RADIOLOGY

## 2019-01-01 PROCEDURE — 77373 STRTCTC BDY RAD THER TX DLVR: CPT | Performed by: RADIOLOGY

## 2019-01-01 PROCEDURE — 99152 MOD SED SAME PHYS/QHP 5/>YRS: CPT | Performed by: RADIOLOGY

## 2019-01-01 PROCEDURE — 84443 ASSAY THYROID STIM HORMONE: CPT

## 2019-01-01 PROCEDURE — 96413 CHEMO IV INFUSION 1 HR: CPT

## 2019-01-01 PROCEDURE — 83540 ASSAY OF IRON: CPT

## 2019-01-01 PROCEDURE — 36415 COLL VENOUS BLD VENIPUNCTURE: CPT

## 2019-01-01 PROCEDURE — 88173 CYTOPATH EVAL FNA REPORT: CPT | Performed by: RADIOLOGY

## 2019-01-01 PROCEDURE — A9575 INJ GADOTERATE MEGLUMI 0.1ML: HCPCS | Performed by: RADIOLOGY

## 2019-01-01 PROCEDURE — 85027 COMPLETE CBC AUTOMATED: CPT | Performed by: RADIOLOGY

## 2019-01-01 PROCEDURE — 85025 COMPLETE CBC W/AUTO DIFF WBC: CPT

## 2019-01-01 PROCEDURE — 99205 OFFICE O/P NEW HI 60 MIN: CPT | Performed by: INTERNAL MEDICINE

## 2019-01-01 PROCEDURE — 77336 RADIATION PHYSICS CONSULT: CPT | Performed by: RADIOLOGY

## 2019-01-01 PROCEDURE — 88333 PATH CONSLTJ SURG CYTO XM 1: CPT | Performed by: RADIOLOGY

## 2019-01-01 PROCEDURE — 99211 OFF/OP EST MAY X REQ PHY/QHP: CPT

## 2019-01-01 PROCEDURE — 88305 TISSUE EXAM BY PATHOLOGIST: CPT | Performed by: RADIOLOGY

## 2019-01-01 PROCEDURE — 99204 OFFICE O/P NEW MOD 45 MIN: CPT | Performed by: PHYSICAL MEDICINE & REHABILITATION

## 2019-01-01 PROCEDURE — 70553 MRI BRAIN STEM W/O & W/DYE: CPT | Performed by: RADIOLOGY

## 2019-01-01 PROCEDURE — 77290 THER RAD SIMULAJ FIELD CPLX: CPT | Performed by: RADIOLOGY

## 2019-01-01 PROCEDURE — 0DB98ZX EXCISION OF DUODENUM, VIA NATURAL OR ARTIFICIAL OPENING ENDOSCOPIC, DIAGNOSTIC: ICD-10-PCS | Performed by: INTERNAL MEDICINE

## 2019-01-01 PROCEDURE — 77300 RADIATION THERAPY DOSE PLAN: CPT | Performed by: RADIOLOGY

## 2019-01-01 PROCEDURE — 99215 OFFICE O/P EST HI 40 MIN: CPT | Performed by: NURSE PRACTITIONER

## 2019-01-01 PROCEDURE — 77295 3-D RADIOTHERAPY PLAN: CPT | Performed by: RADIOLOGY

## 2019-01-01 PROCEDURE — 77470 SPECIAL RADIATION TREATMENT: CPT | Performed by: RADIOLOGY

## 2019-01-01 PROCEDURE — 77280 THER RAD SIMULAJ FIELD SMPL: CPT | Performed by: RADIOLOGY

## 2019-01-01 PROCEDURE — 71045 X-RAY EXAM CHEST 1 VIEW: CPT | Performed by: RADIOLOGY

## 2019-01-01 PROCEDURE — 99214 OFFICE O/P EST MOD 30 MIN: CPT | Performed by: PHYSICAL MEDICINE & REHABILITATION

## 2019-01-01 PROCEDURE — 86900 BLOOD TYPING SEROLOGIC ABO: CPT

## 2019-01-01 PROCEDURE — 77012 CT SCAN FOR NEEDLE BIOPSY: CPT | Performed by: RADIOLOGY

## 2019-01-01 PROCEDURE — 99223 1ST HOSP IP/OBS HIGH 75: CPT | Performed by: INTERNAL MEDICINE

## 2019-01-01 PROCEDURE — 82565 ASSAY OF CREATININE: CPT

## 2019-01-01 PROCEDURE — 88341 IMHCHEM/IMCYTCHM EA ADD ANTB: CPT | Performed by: RADIOLOGY

## 2019-01-01 PROCEDURE — 78815 PET IMAGE W/CT SKULL-THIGH: CPT | Performed by: RADIOLOGY

## 2019-01-01 PROCEDURE — 71250 CT THORAX DX C-: CPT | Performed by: RADIOLOGY

## 2019-01-01 PROCEDURE — 82962 GLUCOSE BLOOD TEST: CPT

## 2019-01-01 PROCEDURE — 0DB68ZX EXCISION OF STOMACH, VIA NATURAL OR ARTIFICIAL OPENING ENDOSCOPIC, DIAGNOSTIC: ICD-10-PCS | Performed by: INTERNAL MEDICINE

## 2019-01-01 PROCEDURE — 99212 OFFICE O/P EST SF 10 MIN: CPT

## 2019-01-01 PROCEDURE — 88172 CYTP DX EVAL FNA 1ST EA SITE: CPT | Performed by: RADIOLOGY

## 2019-01-01 PROCEDURE — 62321 NJX INTERLAMINAR CRV/THRC: CPT | Performed by: PHYSICAL MEDICINE & REHABILITATION

## 2019-01-01 PROCEDURE — 88342 IMHCHEM/IMCYTCHM 1ST ANTB: CPT | Performed by: RADIOLOGY

## 2019-01-01 PROCEDURE — 86901 BLOOD TYPING SEROLOGIC RH(D): CPT

## 2019-01-01 PROCEDURE — 71046 X-RAY EXAM CHEST 2 VIEWS: CPT | Performed by: EMERGENCY MEDICINE

## 2019-01-01 PROCEDURE — 86850 RBC ANTIBODY SCREEN: CPT

## 2019-01-01 PROCEDURE — 36415 COLL VENOUS BLD VENIPUNCTURE: CPT | Performed by: RADIOLOGY

## 2019-01-01 PROCEDURE — 77399 UNLISTED PX MED RADJ PHYSICS: CPT | Performed by: RADIOLOGY

## 2019-01-01 PROCEDURE — 77331 SPECIAL RADIATION DOSIMETRY: CPT | Performed by: RADIOLOGY

## 2019-01-01 PROCEDURE — 88334 PATH CONSLTJ SURG CYTO XM EA: CPT | Performed by: RADIOLOGY

## 2019-01-01 RX ORDER — MIDAZOLAM HYDROCHLORIDE 1 MG/ML
1 INJECTION INTRAMUSCULAR; INTRAVENOUS ONCE
Status: CANCELLED | OUTPATIENT
Start: 2019-01-01

## 2019-01-01 RX ORDER — GUAIFENESIN 600 MG
600 TABLET, EXTENDED RELEASE 12 HR ORAL 2 TIMES DAILY
Status: DISCONTINUED | OUTPATIENT
Start: 2019-01-01 | End: 2019-01-01

## 2019-01-01 RX ORDER — SODIUM CHLORIDE 9 MG/ML
INJECTION, SOLUTION INTRAVENOUS ONCE
Status: COMPLETED | OUTPATIENT
Start: 2019-01-01 | End: 2019-01-01

## 2019-01-01 RX ORDER — FLUMAZENIL 0.1 MG/ML
0.2 INJECTION, SOLUTION INTRAVENOUS ONCE
Status: CANCELLED | OUTPATIENT
Start: 2019-01-01

## 2019-01-01 RX ORDER — LOSARTAN POTASSIUM 25 MG/1
25 TABLET ORAL DAILY
Status: DISCONTINUED | OUTPATIENT
Start: 2019-01-01 | End: 2019-01-01

## 2019-01-01 RX ORDER — MIDAZOLAM HYDROCHLORIDE 1 MG/ML
1 INJECTION INTRAMUSCULAR; INTRAVENOUS ONCE
Status: COMPLETED | OUTPATIENT
Start: 2019-01-01 | End: 2019-01-01

## 2019-01-01 RX ORDER — SODIUM CHLORIDE, SODIUM LACTATE, POTASSIUM CHLORIDE, CALCIUM CHLORIDE 600; 310; 30; 20 MG/100ML; MG/100ML; MG/100ML; MG/100ML
INJECTION, SOLUTION INTRAVENOUS CONTINUOUS PRN
Status: DISCONTINUED | OUTPATIENT
Start: 2019-01-01 | End: 2019-01-01 | Stop reason: SURG

## 2019-01-01 RX ORDER — SODIUM CHLORIDE 9 MG/ML
INJECTION, SOLUTION INTRAVENOUS CONTINUOUS
Status: DISCONTINUED | OUTPATIENT
Start: 2019-01-01 | End: 2019-01-01

## 2019-01-01 RX ORDER — FLUMAZENIL 0.1 MG/ML
0.2 INJECTION, SOLUTION INTRAVENOUS ONCE
Status: DISCONTINUED | OUTPATIENT
Start: 2019-01-01 | End: 2019-01-01

## 2019-01-01 RX ORDER — ALBUTEROL SULFATE 2.5 MG/3ML
2.5 SOLUTION RESPIRATORY (INHALATION) EVERY 6 HOURS PRN
Status: DISCONTINUED | OUTPATIENT
Start: 2019-01-01 | End: 2019-01-01

## 2019-01-01 RX ORDER — ALBUTEROL SULFATE 2.5 MG/3ML
2.5 SOLUTION RESPIRATORY (INHALATION)
Status: DISCONTINUED | OUTPATIENT
Start: 2019-01-01 | End: 2019-01-01

## 2019-01-01 RX ORDER — PREGABALIN 50 MG/1
50 CAPSULE ORAL NIGHTLY
Qty: 30 CAPSULE | Refills: 0 | OUTPATIENT
Start: 2019-01-01 | End: 2019-01-01

## 2019-01-01 RX ORDER — NALOXONE HYDROCHLORIDE 0.4 MG/ML
80 INJECTION, SOLUTION INTRAMUSCULAR; INTRAVENOUS; SUBCUTANEOUS ONCE
Status: DISCONTINUED | OUTPATIENT
Start: 2019-01-01 | End: 2019-01-01

## 2019-01-01 RX ORDER — HYDROCODONE BITARTRATE AND ACETAMINOPHEN 5; 325 MG/1; MG/1
1 TABLET ORAL EVERY 4 HOURS PRN
Qty: 30 TABLET | Refills: 0 | Status: SHIPPED | OUTPATIENT
Start: 2019-01-01 | End: 2019-01-01 | Stop reason: ALTCHOICE

## 2019-01-01 RX ORDER — FLUMAZENIL 0.1 MG/ML
0.2 INJECTION, SOLUTION INTRAVENOUS AS NEEDED
Status: CANCELLED | OUTPATIENT
Start: 2019-01-01

## 2019-01-01 RX ORDER — MIDAZOLAM HYDROCHLORIDE 1 MG/ML
INJECTION INTRAMUSCULAR; INTRAVENOUS
Status: DISCONTINUED
Start: 2019-01-01 | End: 2019-01-01

## 2019-01-01 RX ORDER — NALOXONE HYDROCHLORIDE 0.4 MG/ML
80 INJECTION, SOLUTION INTRAMUSCULAR; INTRAVENOUS; SUBCUTANEOUS AS NEEDED
Status: DISCONTINUED | OUTPATIENT
Start: 2019-01-01 | End: 2019-01-01 | Stop reason: HOSPADM

## 2019-01-01 RX ORDER — SODIUM CHLORIDE 9 MG/ML
INJECTION, SOLUTION INTRAVENOUS ONCE
Status: DISCONTINUED | OUTPATIENT
Start: 2019-01-01 | End: 2019-01-01

## 2019-01-01 RX ORDER — SODIUM CHLORIDE 9 MG/ML
INJECTION, SOLUTION INTRAVENOUS ONCE
Status: CANCELLED | OUTPATIENT
Start: 2019-01-01

## 2019-01-01 RX ORDER — SODIUM CHLORIDE, SODIUM LACTATE, POTASSIUM CHLORIDE, CALCIUM CHLORIDE 600; 310; 30; 20 MG/100ML; MG/100ML; MG/100ML; MG/100ML
INJECTION, SOLUTION INTRAVENOUS CONTINUOUS
Status: DISCONTINUED | OUTPATIENT
Start: 2019-01-01 | End: 2019-01-01

## 2019-01-01 RX ORDER — MORPHINE SULFATE 2 MG/ML
1 INJECTION, SOLUTION INTRAMUSCULAR; INTRAVENOUS EVERY 2 HOUR PRN
Status: DISCONTINUED | OUTPATIENT
Start: 2019-01-01 | End: 2019-01-01

## 2019-01-01 RX ORDER — MIDAZOLAM HYDROCHLORIDE 1 MG/ML
1 INJECTION INTRAMUSCULAR; INTRAVENOUS ONCE
Status: DISCONTINUED | OUTPATIENT
Start: 2019-01-01 | End: 2019-01-01

## 2019-01-01 RX ORDER — GUAIFENESIN 600 MG
600 TABLET, EXTENDED RELEASE 12 HR ORAL 2 TIMES DAILY
Qty: 60 TABLET | Refills: 0 | Status: SHIPPED | COMMUNITY
Start: 2019-01-01 | End: 2019-01-01 | Stop reason: ALTCHOICE

## 2019-01-01 RX ORDER — PANTOPRAZOLE SODIUM 40 MG/1
40 TABLET, DELAYED RELEASE ORAL
Status: DISCONTINUED | OUTPATIENT
Start: 2019-01-01 | End: 2019-01-01

## 2019-01-01 RX ORDER — NALOXONE HYDROCHLORIDE 0.4 MG/ML
80 INJECTION, SOLUTION INTRAMUSCULAR; INTRAVENOUS; SUBCUTANEOUS AS NEEDED
Status: CANCELLED | OUTPATIENT
Start: 2019-01-01

## 2019-01-01 RX ORDER — BISACODYL 10 MG
10 SUPPOSITORY, RECTAL RECTAL
Status: DISCONTINUED | OUTPATIENT
Start: 2019-01-01 | End: 2019-01-01

## 2019-01-01 RX ORDER — FLUTICASONE PROPIONATE 50 MCG
1 SPRAY, SUSPENSION (ML) NASAL 2 TIMES DAILY
Status: DISCONTINUED | OUTPATIENT
Start: 2019-01-01 | End: 2019-01-01

## 2019-01-01 RX ORDER — SODIUM CHLORIDE 9 MG/ML
INJECTION, SOLUTION INTRAVENOUS CONTINUOUS
Status: CANCELLED | OUTPATIENT
Start: 2019-01-01

## 2019-01-01 RX ORDER — MORPHINE SULFATE 2 MG/ML
2 INJECTION, SOLUTION INTRAMUSCULAR; INTRAVENOUS EVERY 2 HOUR PRN
Status: DISCONTINUED | OUTPATIENT
Start: 2019-01-01 | End: 2019-01-01

## 2019-01-01 RX ORDER — SODIUM CHLORIDE 0.9 % (FLUSH) 0.9 %
3 SYRINGE (ML) INJECTION AS NEEDED
Status: DISCONTINUED | OUTPATIENT
Start: 2019-01-01 | End: 2019-01-01

## 2019-01-01 RX ORDER — LIDOCAINE HYDROCHLORIDE 10 MG/ML
INJECTION, SOLUTION EPIDURAL; INFILTRATION; INTRACAUDAL; PERINEURAL AS NEEDED
Status: DISCONTINUED | OUTPATIENT
Start: 2019-01-01 | End: 2019-01-01 | Stop reason: SURG

## 2019-01-01 RX ORDER — PREGABALIN 50 MG/1
50 CAPSULE ORAL 2 TIMES DAILY
Qty: 60 CAPSULE | Refills: 2 | OUTPATIENT
Start: 2019-01-01 | End: 2020-01-01 | Stop reason: DRUGHIGH

## 2019-01-01 RX ORDER — DILTIAZEM HYDROCHLORIDE 120 MG/1
120 CAPSULE, EXTENDED RELEASE ORAL DAILY
Status: DISCONTINUED | OUTPATIENT
Start: 2019-01-01 | End: 2019-01-01

## 2019-01-01 RX ORDER — SODIUM PHOSPHATE, DIBASIC AND SODIUM PHOSPHATE, MONOBASIC 7; 19 G/133ML; G/133ML
1 ENEMA RECTAL ONCE AS NEEDED
Status: DISCONTINUED | OUTPATIENT
Start: 2019-01-01 | End: 2019-01-01

## 2019-01-01 RX ORDER — NALOXONE HYDROCHLORIDE 0.4 MG/ML
80 INJECTION, SOLUTION INTRAMUSCULAR; INTRAVENOUS; SUBCUTANEOUS ONCE
Status: CANCELLED | OUTPATIENT
Start: 2019-01-01

## 2019-01-01 RX ORDER — MELATONIN
325 2 TIMES DAILY WITH MEALS
Status: DISCONTINUED | OUTPATIENT
Start: 2019-01-01 | End: 2019-01-01

## 2019-01-01 RX ORDER — MORPHINE SULFATE 4 MG/ML
4 INJECTION, SOLUTION INTRAMUSCULAR; INTRAVENOUS EVERY 2 HOUR PRN
Status: DISCONTINUED | OUTPATIENT
Start: 2019-01-01 | End: 2019-01-01

## 2019-01-01 RX ORDER — POLYETHYLENE GLYCOL 3350 17 G/17G
17 POWDER, FOR SOLUTION ORAL DAILY PRN
Status: DISCONTINUED | OUTPATIENT
Start: 2019-01-01 | End: 2019-01-01

## 2019-01-01 RX ORDER — ACETAMINOPHEN 500 MG
500 TABLET ORAL EVERY 6 HOURS PRN
Status: DISCONTINUED | OUTPATIENT
Start: 2019-01-01 | End: 2019-01-01

## 2019-01-01 RX ORDER — HYDROCODONE BITARTRATE AND ACETAMINOPHEN 5; 325 MG/1; MG/1
2 TABLET ORAL EVERY 4 HOURS PRN
Status: DISCONTINUED | OUTPATIENT
Start: 2019-01-01 | End: 2019-01-01

## 2019-01-01 RX ORDER — HYDROCODONE BITARTRATE AND ACETAMINOPHEN 5; 325 MG/1; MG/1
1 TABLET ORAL EVERY 4 HOURS PRN
Status: DISCONTINUED | OUTPATIENT
Start: 2019-01-01 | End: 2019-01-01

## 2019-01-01 RX ORDER — PANTOPRAZOLE SODIUM 40 MG/1
40 TABLET, DELAYED RELEASE ORAL
Qty: 30 TABLET | Refills: 0 | Status: SHIPPED | OUTPATIENT
Start: 2019-01-01 | End: 2019-01-01

## 2019-01-01 RX ORDER — DOCUSATE SODIUM 100 MG/1
100 CAPSULE, LIQUID FILLED ORAL 2 TIMES DAILY
Status: DISCONTINUED | OUTPATIENT
Start: 2019-01-01 | End: 2019-01-01

## 2019-01-01 RX ORDER — ACETAMINOPHEN 325 MG/1
650 TABLET ORAL EVERY 4 HOURS PRN
Status: DISCONTINUED | OUTPATIENT
Start: 2019-01-01 | End: 2019-01-01

## 2019-01-01 RX ORDER — ESCITALOPRAM OXALATE 10 MG/1
20 TABLET ORAL DAILY
Status: DISCONTINUED | OUTPATIENT
Start: 2019-01-01 | End: 2019-01-01

## 2019-01-01 RX ORDER — MIDAZOLAM HYDROCHLORIDE 1 MG/ML
1 INJECTION INTRAMUSCULAR; INTRAVENOUS EVERY 5 MIN PRN
Status: CANCELLED | OUTPATIENT
Start: 2019-01-01

## 2019-01-01 RX ORDER — SODIUM CHLORIDE 9 MG/ML
INJECTION, SOLUTION INTRAVENOUS
Status: DISCONTINUED
Start: 2019-01-01 | End: 2019-01-01

## 2019-01-01 RX ADMIN — MIDAZOLAM HYDROCHLORIDE 1 MG: 1 INJECTION INTRAMUSCULAR; INTRAVENOUS at 11:40:00

## 2019-01-01 RX ADMIN — LIDOCAINE HYDROCHLORIDE 50 MG: 10 INJECTION, SOLUTION EPIDURAL; INFILTRATION; INTRACAUDAL; PERINEURAL at 12:14:00

## 2019-01-01 RX ADMIN — SODIUM CHLORIDE, SODIUM LACTATE, POTASSIUM CHLORIDE, CALCIUM CHLORIDE: 600; 310; 30; 20 INJECTION, SOLUTION INTRAVENOUS at 12:25:00

## 2019-01-01 RX ADMIN — SODIUM CHLORIDE, SODIUM LACTATE, POTASSIUM CHLORIDE, CALCIUM CHLORIDE: 600; 310; 30; 20 INJECTION, SOLUTION INTRAVENOUS at 12:08:00

## 2019-02-03 ENCOUNTER — HOSPITAL ENCOUNTER (OUTPATIENT)
Dept: CT IMAGING | Facility: HOSPITAL | Age: 84
Discharge: HOME | End: 2019-02-03
Attending: RADIOLOGY
Payer: MEDICARE

## 2019-02-03 DIAGNOSIS — C34.90 LUNG CANCER (HCC): ICD-10-CM

## 2019-02-03 PROCEDURE — 71250 CT THORAX DX C-: CPT | Performed by: RADIOLOGY

## 2019-02-06 ENCOUNTER — OFFICE VISIT (OUTPATIENT)
Dept: RADIATION ONCOLOGY | Facility: HOSPITAL | Age: 84
End: 2019-02-06
Attending: RADIOLOGY
Payer: MEDICARE

## 2019-02-06 VITALS
OXYGEN SATURATION: 96 % | BODY MASS INDEX: 17 KG/M2 | SYSTOLIC BLOOD PRESSURE: 152 MMHG | WEIGHT: 91.63 LBS | HEART RATE: 68 BPM | RESPIRATION RATE: 18 BRPM | TEMPERATURE: 98 F | DIASTOLIC BLOOD PRESSURE: 68 MMHG

## 2019-02-06 PROCEDURE — 99211 OFF/OP EST MAY X REQ PHY/QHP: CPT

## 2019-02-06 NOTE — PROGRESS NOTES
Pt came in accompanied by her daughter. Pt is here to review ct scan. Medical history reviewed. Medications reviewed. Pt b/p is high, states she is having pain to left hand and right shoulder. Steady gait noted.  States she is still smoking 1 pack dawna

## 2019-02-06 NOTE — PROGRESS NOTES
RADIATION ONCOLOGY NOTE    DATE OF VISIT: 2/6/2019    DIAGNOSIS: Stage IA, T1a N0 M0, adenocarcinoma of the left lung, status post definitive CyberKnife radiosurgery back in 2012, with new PET positive RUL nodule, suspicious malignancy, with smoking depend or metastatic lymph  node. 3. 2 cm lesion in the left upper lobe does not demonstrate any significant hypermetabolism and may  be treatment related.   4. Multiple hypermetabolic masses in the parotid tails bilaterally as detailed above, probably  [place]' Malignant neoplasm of upper lobe of left lung ([address] Neuropathy, Osteoarthritis, OTHER DISEASES, and Unspecified essential hypertension.     PAST SURGICAL HISTORY:   has a past surgical history that includes cataract; other surgical history (2013, [facility]) HEENT:  PERRLA, EOMI, oropharynx is clear with no lesions. NECK:  Supple with no cervical, supraclavicular or axillary lymphadenopathy. CHEST:  Clear to auscultation with decreased breath sounds.     CARDIAC:  Normal S1, S2   ABDOMEN:  Soft with no mas

## 2019-02-15 PROCEDURE — 77290 THER RAD SIMULAJ FIELD CPLX: CPT | Performed by: RADIOLOGY

## 2019-02-15 PROCEDURE — 77470 SPECIAL RADIATION TREATMENT: CPT | Performed by: RADIOLOGY

## 2019-02-21 PROCEDURE — 77370 RADIATION PHYSICS CONSULT: CPT | Performed by: RADIOLOGY

## 2019-02-21 PROCEDURE — 77295 3-D RADIOTHERAPY PLAN: CPT | Performed by: RADIOLOGY

## 2019-02-21 PROCEDURE — 77293 RESPIRATOR MOTION MGMT SIMUL: CPT | Performed by: RADIOLOGY

## 2019-03-06 NOTE — PROGRESS NOTES
[facility] Radiation Treatment Management Note 1-5    Patient:  King Dean  Age:  82year old  Visit Diagnosis:  No diagnosis found.   Primary Rad/Onc:  Dr. Etienne Zimmer Matias    Site Delivered Dose (Gy) Prescribed Dose (Gy) Fraction #   RT

## 2019-03-08 NOTE — PROGRESS NOTES
Pt tolerated treatment well. Dr. Anderson Collins provided discharge instructions. CT scan due in 3 months. Daughter present for instructions.

## 2019-03-08 NOTE — PROGRESS NOTES
RADIATION ONCOLOGY COMPLETION SUMMARY NOTE    DIAGNOSIS: Stage IA, T1a N0 M0, adenocarcinoma of the left lung, status post definitive CyberKnife radiosurgery back in 2012, with PET positive RUL nodule, highly suspicious malignancy, with smoking dependency,

## 2019-03-08 NOTE — PATIENT INSTRUCTIONS
Ct scan due in June 2019 and then follow up with Dr. Westley Dunlap for results. Call to schedule the ct scan.   Then call [phone] to schedule a follow up appointment to see Dr. Westley Dunlap.

## 2019-04-10 NOTE — TELEPHONE ENCOUNTER
Osman Meadows is calling to speak with Drake Reynoso when I ask what it is regarding she said just a medical questions.  Please call when available

## 2019-04-30 PROBLEM — M67.911 DYSFUNCTION OF RIGHT ROTATOR CUFF: Status: ACTIVE | Noted: 2019-01-01

## 2019-04-30 PROBLEM — M54.12 CERVICAL RADICULOPATHY: Status: ACTIVE | Noted: 2019-01-01

## 2019-04-30 PROBLEM — M75.01 ADHESIVE CAPSULITIS OF RIGHT SHOULDER: Status: ACTIVE | Noted: 2019-01-01

## 2019-04-30 PROBLEM — M48.02 CERVICAL STENOSIS OF SPINE: Status: ACTIVE | Noted: 2019-01-01

## 2019-04-30 PROBLEM — M50.20 CERVICAL HERNIATED DISC: Status: ACTIVE | Noted: 2019-01-01

## 2019-04-30 PROBLEM — M50.90 CERVICAL DISC DISEASE: Status: ACTIVE | Noted: 2019-01-01

## 2019-04-30 PROBLEM — M77.8 RIGHT SHOULDER TENDONITIS: Status: ACTIVE | Noted: 2019-01-01

## 2019-04-30 NOTE — PATIENT INSTRUCTIONS
As of October 6th 2014, the Drug Enforcement Agency [facility]) is reclassifying all hydrocodone combination medications from Schedule III to Schedule II. This includes medications such as Norco, Vicodin, Lortab, Zohydro, and Vicoprofen.     What this means for y will do a left C7-T1 ILESI with 10 mg of dexamethasone and 1 ml of 1% PF Lidocaine without epinephrine and 1 ml of normal sterile saline. She will look into trying CBD oil for the pain.     If the injection helps, then she might benefit from PT on her ce

## 2019-04-30 NOTE — PROGRESS NOTES
Cervical Pain H & P    Chief Complaint:  Patient presents with:  Hand Pain: new patient here with c/o severe pain and burning in the left hand with numbness in the fingers. s/p carpal tunnel sx one year ago. had cortisone injections with no relief.  tried a pain.  She saw another acupuncturist which did not help. She has tried Neurontin 1200 mg/day which did not help. She did prednisone 40 mg a day which did not help. She has tried Ultram which did not help either.   She has a had a recurrence of the lung c Performed by Shukri Bryson MD at [facility]   • OTHER SURGICAL HISTORY  2013, [place]    lung CA - cyberknife; Dr Alice Matias   • UPPER GI ENDOSCOPY PERFORMED  10/3/13=Normal    SB Bx normal       Family History   Family History   Problem R abused: Not on file        Forced sexual activity: Not on file    Other Topics      Concerns:         Service: Not Asked        Blood Transfusions: Not Asked        Caffeine Concern: Yes        Occupational Exposure: Not Asked        Hobby Hazards: bilaterally. Skin:  There are no rashes or lesions. Lymph Nodes: The patient has no palpable submandibular, supraclavicular, and cervical lymph nodes. .    Vitals:   04/30/19  1655   BP: 108/58   Pulse: 76   Resp: 14       Cervical Spine:    Posture: Rotation: mild-moderately limited   Left External Rotation: normal   Shoulder Special Tests: Right Yu test: Positive  Left Yu test: Negative  Right external rotation: Positive  Left external rotation: Negative     C-Spine Special Tests  Special T

## 2019-05-13 NOTE — TELEPHONE ENCOUNTER
Pts daughter calling to schedule injection but no orders have been placed for injection, please advise as it has been 2 weeks since last appt

## 2019-05-13 NOTE — TELEPHONE ENCOUNTER
Medicare Online for insurance coverage of left C7-T1 ILESI cpt code 13614. Insurance was verified and procedure is a covered benefit and does not require authorization. Will inform the Pt.

## 2019-05-13 NOTE — TELEPHONE ENCOUNTER
Patient has been scheduled for a  left C7-T1 ILESI with 10 mg of dexamethasone and 1 ml of 1% PF Lidocaine without epinephrine and 1 ml of normal sterile saline  on 5/31/19 at the [facility]. Medications and allergies reviewed.  Patients daughter Daryl Jones informed

## 2019-05-13 NOTE — TELEPHONE ENCOUNTER
Reviewed Dr. Kraig Veras last office note on 4/30/19-will do left C7-T1 ILESI with 10 mg of dexamethasone and 1 ml of 1% PF Lidocaine without epinephrine and 1 ml of normal sterile saline.-order placed    LMTCB-to schedule left C7-T1 ILESI with 10 mg of dexame

## 2019-05-28 NOTE — TELEPHONE ENCOUNTER
Left message on daughter`s voice mail to call office to reschedule injection. Advised office will try to call again later also.

## 2019-05-29 NOTE — TELEPHONE ENCOUNTER
Contacted patients daughter Nya Rodriguez who would like to cancel injection at this time. Brooklyn's questions were answered regarding the injection.  Due to Dr. Erik Sumner discussing possible paralysis at office visit patient is afraid and would not like to proceed at

## 2019-06-24 NOTE — TELEPHONE ENCOUNTER
S/w pt's dtg, Brooklyn,  and outlined the procedure pt will be having, walking her through the process step-by-step. Pt's dtg states she would like to move forward w/ ILESI as pt's quality of life has declined d/t the pain.  Patient has been scheduled for a

## 2019-07-05 NOTE — PROCEDURES
[address].    CERVICAL INTERLAMINAR  NAME:  Josey Faulkner    MR #:    [id number] :  1935     PHYSICIAN:  Mady Vasquez        Operative Report    DATE OF PROCEDURE: 2019   PREOPERATIVE DIAGNOSES: 1. bilateral C6 & C8 needle was removed. The patient's skin was cleaned. A Band-Aid was applied. The patient was transferred to the cart and into [facility]. The patient was given discharge instructions and will follow up in the clinic as scheduled.   Throughout the whole procedur

## 2019-08-28 NOTE — TELEPHONE ENCOUNTER
Spoke to Katrina cary who states patient did not receive any relief from the left C7-T1 ILESI 7/5/19 and continues to have constant left hand pain with severe numbness/burning sensations.  Brooklyn would like patient to be re-evaluated and wonders if Dr. Viann Meckel can

## 2019-08-28 NOTE — TELEPHONE ENCOUNTER
Reviewed with Dr. Fili Obrien who states 75mg of Lyrica would be too strong for the patient. Per Dr Vesta Cobian can try 50mg qhs of Lyrica.     LMTCB-to inform Brooklyn of Dr. Marylene Copes response

## 2019-08-28 NOTE — TELEPHONE ENCOUNTER
Patients daughter Lara Ly left message regarding continued pain post left C7-T1 ILESI 7/5/19 and requesting appt

## 2019-08-29 NOTE — TELEPHONE ENCOUNTER
Lyrica 50mg qhs #30 r-0 called in to [place] in [facility]-to notify Katrina cary of the above. Brooklyn instructed to call back with questions. -[employer] message also sent to patients daughter Iliadalia traceykayaln.

## 2019-09-05 NOTE — PROGRESS NOTES
Patient has been scheduled for a C7-T1 ILESI under IVCS on 9/24/19 at the [facility]. Medications and allergies reviewed. Patient informed to hold aspirins, nsaids, blood thinners, vitamins and fish oils 3-7 days prior to procedure.  Patient informed he/she will

## 2019-09-05 NOTE — TELEPHONE ENCOUNTER
Medicare Online for insurance coverage of C7-T1 ILESI cpt code 94446. Insurance was verified and procedure is a covered benefit and does not require authorization. Will inform Nursing.

## 2019-09-05 NOTE — PATIENT INSTRUCTIONS
Plan  I will try a C7-T1 ILESI under IV conscious sedation with 12 mg of Celestone. She will try a cervical soft collar. She will continue with her current activities.     The patient will follow up in 3 months, but the patient will call me 2 weeks

## 2019-09-05 NOTE — PROGRESS NOTES
Cervical Pain H & P    Chief Complaint:  Patient presents with:  Hand Pain: LOV: 7/5/19 Left C7-T1 ILESI, 4/30/19 OV - Pt f/u with burning/pain in LLE, bilateral shoulder, is now starting in RUE.  She did not receive any relief from the injection, took her Wilmot Gitelman, MD at [address] [facility]   • CATARACT     • COLONOSCOPY  10/3/13= Hemorrhoids    hemorrhoids; repeat PRN; Dr Chin Prophet   • ESOPHAGOGASTRODUODENOSCOPY, COLONOSCOPY, POSSIBLE BIOPSY, POSSIBLE POLYPECTOMY [phone], [id number] N/A 10/3/2013    Perfo organization: Not on file        Attends meetings of clubs or organizations: Not on file        Relationship status: Not on file      Intimate partner violence:        Fear of current or ex partner: Not on file        Emotionally abused: Not on file Muscle Strength:  All Upper Extremity strength measurements 5/5 except:  ABP Right: 4-/5  ABP Left: 4-/5  FDI Right: 4-/5  FDI Left: 4-/5  ADM Right: 4-/5  ADM Left: 4-/5  EIP Right: 4-/5  EIP Left: 4-/5  Finger Extensors Right: 4-/5  Finger Extensors Left:

## 2019-09-12 NOTE — PROGRESS NOTES
Pt came in accompanied by her son. VSS. Steady gait noted. Continues to smoke 1 pack + daily. Productive cough with clear phlegm. SOB with exertion. On oxygen 2LNC at night. Decreased appetite. Dr. Zbigniew Hernández saw pt will order a biopsy of ALIYAH.   I called and

## 2019-09-12 NOTE — PROGRESS NOTES
RADIATION ONCOLOGY NOTE    DATE OF VISIT: 9/12/2019      DIAGNOSIS: Stage IA, T1a N0 M0, adenocarcinoma of the left lung, status post definitive CyberKnife radiosurgery back in 2012, with PET positive RUL nodule, highly suspicious malignancy, with smoking imaging is recommended 8. Hypermetabolic bilateral parotid nodules are grossly unchanged from the previous study.   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 She has never used smokeless tobacco. She reports that she does not drink alcohol or use drugs.     PAST FAMILY HISTORY   family history includes Cancer in her father and mother; Heart Disorder in her father and mother and to analyze biomarkers. Given the peripheral location of the ALIYAH nodule close to the chest wall, I have discussed the role of CT guided biopsy and have asked the patient to consider as she is a potential candidate for salvage treatments.        I ha

## 2019-09-16 NOTE — IMAGING NOTE
Spoke to Elisa(Patient's daughter) regarding the procedure for tomorrow;patient is already instructed and aware of arrival time, NPO status,  to go home with and recovery time. Medications and allergy reviewed.

## 2019-09-17 NOTE — IVS NOTE
Pt was able to eat and drink, no nausea or vomiting. Xray done, read by Dr. Misbah Cleaning. Discharge instructions given to pt and family.

## 2019-09-17 NOTE — PROGRESS NOTES
PROCEDURE COMPLETE. 2X2 AND TEGADERM APPLIED TO LEFT UPPER SIDE. PT TO ROOM 9 CATH LAB HOLDING AREA.  REPORT TO RN. CHEST XRAY ORDERED FOR 1400 PER DR SMITH VERBAL ORDER

## 2019-09-18 NOTE — TELEPHONE ENCOUNTER
Pt's dtg, [place] (HIPAA verified) called to explain her mother has been diagnosed w/ small cell lung cancer and will need to post-pone the C7-T1 ILESI she has scheduled w/ Dr. Jesus Altamirano for 9/24/19.  Radiational oncologist is recommending pt continue w/ pain ma

## 2019-09-18 NOTE — TELEPHONE ENCOUNTER
Spoke to Carlos made her aware that MRI has been ordered. Armando Alcala is working on scheduling an appointment for Dr. Carlitos Alexandre and Dr. George Moreno on the same day per Dr. Carlitos Alexandre for next week.

## 2019-09-26 PROBLEM — C34.92 SMALL CELL LUNG CANCER, LEFT (HCC): Status: ACTIVE | Noted: 2019-01-01

## 2019-09-26 NOTE — PROGRESS NOTES
RADIATION ONCOLOGY NOTE    DATE OF VISIT: 9/26/2019    DIAGNOSIS:   Biopsy proven left lung Small cell lung cancer, with history of Stage IA, T1a N0 M0, adenocarcinoma of the left lung, status post definitive CyberKnife radiosurgery back in 2012, with PET benign however close attention on subsequent imaging is recommended 8. Hypermetabolic bilateral parotid nodules are grossly unchanged from the previous study.   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 Disp:  Rfl:    Probiotic Product (PROBIOTIC DAILY OR) Take by mouth daily. Disp:  Rfl:    albuterol sulfate (2.5 MG/3ML) 0.083% Inhalation Nebu Soln Take 3 mL (2.5 mg total) by nebulization every 12 (twelve) hours.  (Patient taking dif colonoscopy (10/3/13= Hemorrhoids) (hemorrhoids; repeat PRN; Dr Necia Koyanagi); upper gi endoscopy,biopsy (10/3/2013) (Procedure: ESOPHAGOGASTRODUODENOSCOPY, COLONOSCOPY, POSSIBLE BIOPSY, POSSIBLE POLYPECTOMY 16750,79587;  Surgeon: Concha Zabala MD;  Location: Normal S1, S2   ABDOMEN:  Soft with no masses. EXTREMITIES:  There is no upper or lower extremity edema. NEUROLOGIC:  Cranial nerves II-XII are intact.   Neuro exam is nonfocal.    COMPLETED TESTS:  I have reviewed the patient's clinical, radiographic,

## 2019-09-26 NOTE — PROGRESS NOTES
Nursing Consultation Note  Patient: Julián Marrero  YOB: 1935  Age: 82year old  Radiation Oncologist: Dr. Dixie Corado  Referring Physician: Chacorta Selby  Diagnosis:No diagnosis found.   Consult Date: 9/26/2019      Chemotherapy: no  La acetaminophen 500 MG Oral Tab Take 500 mg by mouth every 6 (six) hours as needed for Pain. Disp:  Rfl:    Probiotic Product (PROBIOTIC DAILY OR) Take by mouth daily.    Disp:  Rfl:    albuterol sulfate (2.5 MG/3ML) 0.083% Inhalation Nebu Soln Take 3 mL (2 CATARACT     • COLONOSCOPY  10/3/13= Hemorrhoids    hemorrhoids; repeat PRN; Dr Mary Carmen Miranda   • ESOPHAGOGASTRODUODENOSCOPY, COLONOSCOPY, POSSIBLE BIOPSY, POSSIBLE POLYPECTOMY 04096, 47102 N/A 10/3/2013    Performed by Mikki Manuel MD at [address] [place] [address],  Service: Not Asked        Blood Transfusions: Not Asked        Caffeine Concern: Yes        Occupational Exposure: Not Asked        Hobby Hazards: Not Asked        Sleep Concern: Not Asked        Stress Concern: Not Asked        Weight Concern: excellent  Able to read?  yes  Able to write? yes  Communication tools:  None  Patient's ability to learn:  excellent  Readiness to learn:   Motivated  Learning preferences:  Discussion and Written  Barriers to learning:  None  Interventions to reduce barr

## 2019-09-27 NOTE — CONSULTS
YUMIKO Finnegan is an 82year old female seen today with her daughter Lara Ly for evaluation and treatment of a biopsy proven small cell carcinoma of the left lung.       Patient has a previous history of a stage IA, T1a N0 M0 adenocarcinoma of the l cigarettes. Review of Systems:     Review of Systems   Constitutional: Negative for chills and fever. Patient keeps her cell phone in her pocket if she goes outside to obtain the mail or move the garbage cans.    HENT: Negative for mouth sores and Oral Cap Take 1 capsule by mouth daily. Disp:  Rfl:    IRON CR OR Take 1 tablet by mouth daily. 2 tab daily not sure how many mg   Disp:  Rfl:    B Complex Vitamins (VITAMIN B COMPLEX OR) Take 1 tablet by mouth daily.    Disp:  Rfl:    Multiple Vitamins-Min file        Inability: Not on file      Transportation needs:        Medical: Not on file        Non-medical: Not on file    Tobacco Use      Smoking status: Current Every Day Smoker        Packs/day: 1.00        Years: 56.00        Pack years: 64        T CAD   • Stroke Mother    • Diabetes Neg    • Lipids Neg          PHYSICAL EXAM:    /56 (BP Location: Left arm, Patient Position: Sitting, Cuff Size: adult)   Pulse 74   Temp 97.7 °F (36.5 °C) (Oral)   Resp 16   Ht 1.575 m (5' 2\")   Wt 38.8 kg aspiration:  Adequacy: Satisfactory but limited by:  Scant cellularity  General Category: Positive for malignancy  Diagnosis:  · Rare atypical epithelial cells with cytomorphologic features diagnostic of small cell carcinoma  ============================== MCV      80.0 - 100.0 fL 94.7 98.7 95.1   MCH      27.0 - 33.2 pg  29.5 29.4   MCHC      31.0 - 37.0 g/dL 31.2 29.9 (L) 30.9 (L)   Platelet Count      084.9 - 450.0 10(3)uL 154.0 192 216   RDW      11.5 - 16.0 %  15.8 17.0 (H)   MEAN PLATELET VOLUME

## 2019-09-30 NOTE — PROGRESS NOTES
Medication Education Record: IV Therapy    Learner:  Patient and Family Member    Barriers / Limitations:  None    Psychosocial Assessment:  patient psychosocial response appropriate    Diagnosis:   Lung cancer     IV Cancer Treatment Name(s): pembrolizuma Diet:  o Avoid greasy or spicy foods on days surrounding chemotherapy  o Eat small frequent meals per day (6-7 meals) rather than 3 large meals  o Choose high calorie/high protein foods (chicken, hard cooked eggs, peanut butter, cheese)  o If nauseated, loss has occurred, protect the scalp from the sun by wearing a hat and use sunscreen. Apply alcohol-free moisturizer as needed.     When to call the doctor:  • Fever of 100.5 or greater or shaking chills  • Nausea/vomiting not controlled with anti-nausea m Handling Body Waste:   1. Caregivers must wear gloves if exposed to the patient’s blood, urine, stool or vomit. Dispose of the used gloves after each use and wash hands with soap and water.   2. Any sheets or clothes soiled with the bodily fluids should time in your cycle when you could become pregnant or if you are actually pregnant.

## 2019-10-01 NOTE — TELEPHONE ENCOUNTER
Spoke to Katrina cary patients daughter who states Dr. Taveras Done informed patient to get medical marijuana card from Dr. Erik Sumner.     [place] notified unfortunately of the qualifying condition on the [facility] cannabis form Dr. Erik Sumner does not treat patient for one o

## 2019-10-01 NOTE — PATIENT INSTRUCTIONS
Medication Education Record: IV Therapy     Learner:  Patient and Family Member     Barriers / Limitations:  None     Psychosocial Assessment:  patient psychosocial response appropriate     Diagnosis:   Lung cancer      IV Cancer Treatment Name(s): pembrol during cancer treatment:                  Diet:  ? Avoid greasy or spicy foods on days surrounding chemotherapy  ? Eat small frequent meals per day (6-7 meals) rather than 3 large meals  ?  Choose high calorie/high protein foods (chicken, hard cooked eggs, day, especially after baths. · If scalp hair loss has occurred, protect the scalp from the sun by wearing a hat and use sunscreen.   Apply alcohol-free moisturizer as needed.     When to call the doctor:  · Fever of 100.5 or greater or shaking chills  · Na taken to lessen any exposure to the medication. Handling Body Waste:   1. Caregivers must wear gloves if exposed to the patient’s blood, urine, stool or vomit. Dispose of the used gloves after each use and wash hands with soap and water.   2. Any sheet after treatment, so you may not know if you are at a time in your cycle when you could become pregnant or if you are actually pregnant.

## 2019-10-01 NOTE — PROGRESS NOTES
Medication Education Record: IV Therapy     Learner:  Patient and Family Member     Barriers / Limitations:  None     Psychosocial Assessment:  patient psychosocial response appropriate     Diagnosis:   Lung cancer      IV Cancer Treatment Name(s): pembrol during cancer treatment:                  Diet:  ? Avoid greasy or spicy foods on days surrounding chemotherapy  ? Eat small frequent meals per day (6-7 meals) rather than 3 large meals  ?  Choose high calorie/high protein foods (chicken, hard cooked eggs, day, especially after baths. · If scalp hair loss has occurred, protect the scalp from the sun by wearing a hat and use sunscreen.   Apply alcohol-free moisturizer as needed.     When to call the doctor:  · Fever of 100.5 or greater or shaking chills  · Na taken to lessen any exposure to the medication. Handling Body Waste:   1. Caregivers must wear gloves if exposed to the patient’s blood, urine, stool or vomit. Dispose of the used gloves after each use and wash hands with soap and water.   2. Any sheet after treatment, so you may not know if you are at a time in your cycle when you could become pregnant or if you are actually pregnant.     Pt here today with daughter. Pt's  had been on opdivo in the past. Pt denies chronic infections.  No issues wi

## 2019-10-10 NOTE — PROGRESS NOTES
Oncology Nutrition Assessment    Ht Readings from Last 1 Encounters:  10/01/19 : 157.5 cm (5' 2\")      Wt Readings from Last 1 Encounters:  10/10/19 : 38.1 kg (84 lb)    BMI Calculated: Body mass index is 15.36 kg/m². Weight History:   Wt Readings from L importance of good oral intake for wt maint/gain during tx. Urged adequate fluids. Balance rest and activity. Discussed small frequent, high calorie/high protein foods, nutritional supplement daily. Discourage further wt loss.         Nutritional Goals:

## 2019-10-11 NOTE — TELEPHONE ENCOUNTER
Patients daughter Sigrid Duffy would like to know if Lyrica can be increased. Feels 50mg daily is helping but there is still a significant amount of neuropathy and is unsure if radiation is causing symptoms to be inflamed.     Please advise    LOV 9/5/19 reina hannah

## 2019-10-14 NOTE — PROGRESS NOTES
[facility] Radiation Treatment Management Note 1-5    Patient:  Ruben Castaneda  Age:  82year old  Visit Diagnosis:  No diagnosis found.   Primary Rad/Onc:  Dr. Baltazar Alexandra Matias    Site Delivered Dose (Gy) Prescribed Dose (Gy) Fraction #   Victoria Blanco

## 2019-10-21 NOTE — PROGRESS NOTES
[facility] Radiation Treatment Management Note 6-10    Patient:  Mulugeta Whitten  Age:  82year old  Visit Diagnosis:  No diagnosis found.   Primary Rad/Onc:  Dr. Sung Aguilar Matias    Site Delivered Dose (Gy) Prescribed Dose (Gy) Fraction #   L

## 2019-10-21 NOTE — PATIENT INSTRUCTIONS
Follow up with Dr. Jose Rome as directed by her office. Dr. Jose Rome to order [facility]. Start wearing your oxygen daily. Saturation only at 83% today. CT scan due in 3 months. Dr. Jose Rome to order scans.    Follow up with Dr. Jimmie Alvarez in 3-4 months after your C

## 2019-10-23 NOTE — PROGRESS NOTES
RADIATION ONCOLOGY COMPLETION SUMMARY NOTE    DIAGNOSIS:   Biopsy proven left lung Small cell lung cancer, for sequential XRT and systemic tx, now s/p XRT to left lung on 12/23/2019, to start systemic tx shortly. Dear Loretta Ha, [place] see patient in 3-4 months for a routine follow-up visit. Thank you very much for allowing us to take care of your patient. Calixto Sinclair MD, [address] [place] [address] Ln. [email or web address]  [phone]

## 2019-10-31 NOTE — PROGRESS NOTES
10/31/2019   Cliff Casey is an 82year old female seen today with her daughter Boubacar Bernal for systemic treatment of a biopsy proven small cell carcinoma of the left lung with pembrolizumab.   Patient is completed radiation therapy to the left lung dominic nasal cannula at night and a nebulizer with albuterol. Patient does continue to smoke cigarettes. Review of Systems:     Review of Systems   Constitutional: Negative for chills and fever.         Patient keeps her cell phone in her pocket if she goes ou (Patient taking differently: Take 2.5 mg by nebulization 4 (four) times daily.  ), Disp: 3 Box, Rfl: 3  Cholecalciferol (VITAMIN D) 2000 units Oral Cap, Take 1 capsule by mouth daily. , Disp: , Rfl:   IRON CR OR, Take 1 tablet by mouth daily.  2 tab daily no       Spouse name: Not on file      Number of children: Not on file      Years of education: Not on file      Highest education level: Not on file    Occupational History      Not on file    Social Needs      Financial resource strain: Not on file patient does not use an assistive device. .        The patient does live in a home with stairs.     Family History   Problem Relation Age of Onset   • Cancer Father         lung   • Hypertension Father    • Heart Disorder Father         CHF   • Cancer Mother functional status related to her pain. She declines consideration of chemotherapy.   She was agreeable to a short course of radiation therapy to the lung disease per Dr. Zbigniew Hernández, has had a Port-A-Cath placed, and is willing to consider systemic therapy with pem may reflect benign or malignant parotid neoplasms.  ===================================  Component      Latest Ref Rng & Units 10/31/2019 10/1/2019 3/21/2019   WBC      4.0 - 11.0 x10(3) uL 4.2 5.2 6.21   RBC      3.80 - 5.30 x10(6)uL 4.09 4.18 3.93   Hemo 37 U/L 16 16    ALKALINE PHOSPHATASE      55 - 142 U/L 99 92    Total Bilirubin      0.1 - 2.0 mg/dL 0.4 0.4    TOTAL PROTEIN      6.4 - 8.2 g/dL 7.8 7.7    Albumin      3.4 - 5.0 g/dL 3.3 (L) 3.4    Globulin      2.8 - 4.4 g/dL 4.5 (H) 4.3    A/G Ratio

## 2019-10-31 NOTE — PROGRESS NOTES
Pt here for C1D1.   Arrives Ambulating independently from MD visit, accompanied by Family member Daughter           Patient denies possible pregnancy since last therapy cycle: Not Applicable    Modifications in dose or schedule: No  Reviewed procedure with

## 2019-11-01 PROBLEM — F17.200 SMOKING ADDICTION: Status: ACTIVE | Noted: 2019-01-01

## 2019-11-01 PROBLEM — Z51.11 CHEMOTHERAPY MANAGEMENT, ENCOUNTER FOR: Status: ACTIVE | Noted: 2019-01-01

## 2019-11-01 PROBLEM — R91.1 LESION OF RIGHT LUNG: Status: ACTIVE | Noted: 2019-01-01

## 2019-11-01 NOTE — TELEPHONE ENCOUNTER
Spoke with [employer]-- pt dtr, states that mom is feeling well, no side effects noted. Encouraged to have [place] call if she has any questions or concerns.

## 2019-11-11 NOTE — TELEPHONE ENCOUNTER
Spoke with [employer], states that seven days after she received [place] her PN flared up very bad in her left hand and up her left francesca. She states that Dr. Tyron Herrera had mentioned a pain pill, she is on lyrica, has tried gabapentin and that did not work. Dr. Tyron Herrera to send norco to [place] [id number] in [facility]. Encouraged to have pt call Dr. Pablo Vizcaino and update on this nerve pain.

## 2019-11-11 NOTE — TELEPHONE ENCOUNTER
Sharons daughter called to say she had her first [place] infusion and it seemed to have effects on her a full 7 days after the infusion. They had also spoke with Dr. Killian Eason about getting pain meds and they were looking to speak with somebody about that.  Please advise

## 2019-11-19 PROBLEM — D64.9 ANEMIA: Status: ACTIVE | Noted: 2019-01-01

## 2019-11-19 PROBLEM — D64.9 ANEMIA, UNSPECIFIED TYPE: Status: ACTIVE | Noted: 2019-01-01

## 2019-11-19 PROBLEM — R53.83 OTHER FATIGUE: Status: ACTIVE | Noted: 2019-01-01

## 2019-11-19 NOTE — ED INITIAL ASSESSMENT (HPI)
Edwar Alatorre arrives with her daughter who reports \"deep cough\" x 3 days. Denies fevers. Patient is a small cell lung CA patient of Dr. Tommy Frederick and Dr. Diane Cramer. Last chemo 3 weeks ago    Patient wears 2 L 02 at night and as needed.

## 2019-11-19 NOTE — H&P
[facility] Hospitalist Team  History and Physical  Admit Date:  11/19/19    ASSESSMENT / PLAN:   53-year-old female with COPD, cervical spinal stenosis/PN, chronic hypoxemic resp failure-uses 2 L of oxygen at night, tobacco abuse, hypertension, CAD, iron deficien Radha Hutson, lives in [facility]  -lives with Daughter Ramesh Phelan [phone] who is caretaker  -DNR    KALE  -lytes in am  -IVF,none  -diet-general     Prophy  -SCD  -no AC with anemia    Dispo  -pending clinical coarse  PCP: Sally Romeo MD      Concerns r CARDIOVASCULAR: regular,nl S1 S2; no murmur  ABDOMEN:  Soft, BS+; non distended, non tender    EXTREMITIES: no LE edema, no cyanosis, no calf tenderness, peripheral pulses intact     PMH  Past Medical History:   Diagnosis Date   • Adenocarcinoma of left Cigarettes      Smokeless tobacco: Never Used      Tobacco comment: since age 23    Alcohol use: No      Alcohol/week: 0.0 standard drinks       Fam Hx  Family History   Problem Relation Age of Onset   • Cancer Father         lung   • Hypertension Father breath, cough, sneezing and fatigue. Patient noted to have hemoglobin of 5.5, no signs of blood loss, does complain of gen fatigue, no fevers, no headaches or vision changes, no other complaints.        objective  /77   Pulse 65   Temp 98.1 °F (36.7

## 2019-11-19 NOTE — PLAN OF CARE
Admitted to 452 from ED. 2 units PRBCs transfused, no transfusion reactions noted, repeat hgb 9.2. Denies pain. Ambulating SBA with no device. Bed in lowest position, call light in reach, fall precautions in place.

## 2019-11-19 NOTE — ED PROVIDER NOTES
Patient Seen in: [facility] AND [facility] Emergency Department      History   Patient presents with:  Cough/URI    Stated Complaint: cough    HPI    82-year-old female presents for evaluation of cough.   Patient with productive cough, fatigue, generalized weakne Smoker        Packs/day: 1.00        Years: 56.00        Pack years: 64        Types: Cigarettes      Smokeless tobacco: Never Used      Tobacco comment: since age 23    Alcohol use: No      Alcohol/week: 0.0 standard drinks    Drug use:  No             Rev following components:       Result Value    BUN/CREA Ratio 22.4 (*)     Calcium, Total 8.4 (*)     All other components within normal limits   RBC MORPHOLOGY SCAN - Abnormal; Notable for the following components:    RBC Morphology See morphology below (*) depression              Imaging Results Available and Reviewed while in ED: XR CHEST PA + LAT CHEST (CPT=71046)   Final Result    PROCEDURE: XR CHEST PA + LAT CHEST (CPT=71020)         COMPARISON: [facility], CT BIOPSY LUNG OR MEDIASTINUM 92% 100% 100%    Weight:       Height:         *I personally reviewed and interpreted all ED vitals.           MDM     Differential Diagnosis includes but is not limited to: Pneumonia, viral syndrome, anemia, electrolyte imbalance, dehydration    Limitation fatigue    Disposition:  Admit  11/19/2019 10:16 am    Follow-up:  No follow-up provider specified.   We recommend that you schedule follow up care with a primary care provider within the next three months to obtain basic health screening including reassess

## 2019-11-20 NOTE — PLAN OF CARE
Patient denies pain or nausea. Tolerating general diet. Continued on 1L NC. Attempted to wean off but sats 85 when resting in bed and when ambulating sats 76. AM hgb and afternoon redraw 8.4.  PT/OT worked with patient, both said patient would benefit from ADULT  Goal: Hemodynamic stability and optimal renal function maintained  Description  INTERVENTIONS:  - Monitor labs and assess for signs and symptoms of volume excess or deficit  - Monitor intake, output and patient weight  - Monitor urine specific Johanne Luong

## 2019-11-20 NOTE — PLAN OF CARE
Patient A/O x 4. On O2 1L. Receiving IV Venofer. Voiding freely. Denies pain or nausea. Up with SBA. Fall precautions in place. Call light within reach. Daughter at bedside. Patient and daughter updated on plan of care.    Problem: Patient Centered Care  Go fluid and nutrition restrictions as appropriate  Outcome: Progressing     Problem: HEMATOLOGIC - ADULT  Goal: Maintains hematologic stability  Description  INTERVENTIONS  - Assess for signs and symptoms of bleeding or hemorrhage  - Monitor labs and vital s

## 2019-11-20 NOTE — CONSULTS
[facility]    Report of Consultation    Erin Alegria Patient Status:  Inpatient    1935 MRN [id number]   Location [facility] 4W/SW/SE Attending Mara Gutierrez MD   Hosp Day # 0 PCP Shankar Marroquin MD     Date of Admission: hilum and along the pleura laterally. There were also hazy opacities extending from the hypermetabolic left upper lobe nodule to the hypermetabolic left hilar nodule suspicious for lymphangitic spread. There is no evidence of distant disease.   IR left up CARPAL TUNNEL RELEASE Left 5/25/2018    Performed by Michelle Mcghee MD at [address] [facility]   • CATARACT     • COLONOSCOPY  10/3/13= Hemorrhoids    hemorrhoids; repeat PRN; Dr Jo Hook   • ESOPHAGOGASTRODUODENOSCOPY, COLONOSCOPY, POSSIBLE BIOPSY, POS albuterol sulfate (2.5 MG/3ML) 0.083% Inhalation Nebu Soln, Take 3 mL (2.5 mg total) by nebulization every 12 (twelve) hours.  (Patient taking differently: Take 2.5 mg by nebulization 4 (four) times daily.  )  Cholecalciferol (VITAMIN D) 2000 units Oral C light. Conjunctivae and EOM are normal.   Small lesion noted on the left lower eyelid   Neck:   Limited range of motion cervical spine   Cardiovascular: Normal rate and regular rhythm. Pulmonary/Chest: She is in respiratory distress.    Some increase in couplets -Old anteroseptal infarct.  - Nonspecific T-abnormality.  ABNORMAL When compared with ECG of 02/24/2018 12:15:37 IVCD no longer present, now ectopy Electronically signed on 11/19/2019 at 12:07 by Adenike Jansen MD      Impression:       Anemia  Iron d

## 2019-11-20 NOTE — PROGRESS NOTES
[facility] Hospitalist Team  Progress Note    Nancey Section Patient Status:  Inpatient    1935 MRN [id number]   Location [facility] 4W/SW/SE Attending Giuseppe Ribeiro MD   Hosp Day # 1 PCP Bianca Chaparro MD     CC: Follow Up  PCP: Bianca Chaparro success  -Continue Lyrica  -PRN Norco (given prescription as an outpatient has been reluctant to use)  -follows with Dr Tiny Costello     CAD S/P PTCA  HTN  -2018 nuclear w/ normal perfusion  -continue cardizem and losartan\  -not on ASA or statin ? reason  -foll DBIL, TPROT    No results for input(s): PGLU in the last 168 hours. No results for input(s): TROP in the last 168 hours.         MEDICATIONS      acetaminophen (TYLENOL EXTRA STRENGTH) tab 500 mg, 500 mg, Oral, Q6H PRN  diltiazem (CARDIZEM CD) 24 hr cap distress, alert and oriented x3  Neck Supple,   Pulm: Lungs clear, diminished at bases  CV: Heart with regular rate and rhythm   Abd: Abdomen soft, nontender, nondistended   MSK:  no clubbing, no cyanosis.   No Lower extremity edema  Skin: no rashes or lesi

## 2019-11-20 NOTE — DIETARY NOTE
ADULT NUTRITION INITIAL ASSESSMENT    Pt is at high nutrition risk. Pt meets severe malnutrition criteria.       CRITERIA FOR MALNUTRITION DIAGNOSIS:  Criteria for severe malnutrition diagnosis: chronic illness related to body fat severe depletion and mu diagnostic radiation, High blood pressure, History of blood transfusion (3 years ago), Lesion of right lung (11/1/2019), Lung cancer ([facility] [address] ([place] 2013), Malignant neoplasm of upper lobe of left lung ([facility] (8/31/2016), Neuropathy, Osteoarthritis, OTHER D Oral Daily   • guaiFENesin ER  600 mg Oral BID   • Fluticasone Propionate  1 spray Each Nare BID   • Fluticasone-Umeclidin-Vilant  1 puff Inhalation Daily   • albuterol sulfate  2.5 mg Nebulization Q4H WA (4 times daily)       LABS: reviewed  Recent Labs

## 2019-11-20 NOTE — CM/SW NOTE
SW received MDO to eval for home needs. SW met w/ pt to discuss eventual discharge needs. Pt lives at home w/ her daughter in [place]. Pt lives in a 1 level house w/ 1 external step.  Pt reports to be independent w/ all ADL's and owns home O2, u

## 2019-11-21 NOTE — CONSULTS
[facility]    Report of Consultation    Kenji Timmons Patient Status:  Inpatient    1935 MRN [id number]   Location [facility] 4W/SW/SE Attending Alfredo Vela MD   Hosp Day # 2 PCP Kiah Box MD     Date of Admission 9/26/2019   • Unspecified essential hypertension Borderline       Past Surgical History  Past Surgical History:   Procedure Laterality Date   • CARPAL TUNNEL RELEASE Left 5/25/2018    Performed by Willie Sutton MD at [facility]   • CATARACT (VENOFER) 400 mg in sodium chloride 0.9% 250 mL IVPB, 400 mg, Intravenous, Once  acetaminophen (TYLENOL EXTRA STRENGTH) tab 500 mg, 500 mg, Oral, Q6H PRN  diltiazem (CARDIZEM CD) 24 hr cap 120 mg, 120 mg, Oral, Daily  escitalopram (LEXAPRO) tablet 20 mg, 2 hours as needed for Pain. [DISCONTINUED] IRON CR OR, Take 1 tablet by mouth daily.  2 tab daily not sure how many mg          Allergies    Sulfa Antibiotics       HIVES    Review of Systems:   GENERAL HEALTH: +wt loss  SKIN: denies any unusual skin lesions BIOPSY LUNG OR MEDIASTINUM (LJK=84771/12751), 9/17/2019, 11:37. [facility], XR CHEST AP PORTABLE (CPT=71045), 9/17/2019, 14:04. INDICATIONS: Productive cough x 3 days. History of lung cancer and COPD  TECHNIQUE:   Two views.    FINDINGS: C would entertain EGD given her hx of NSAID use.  Discussed options are proceeding with EGD vs conservative management with PPI therapy empirically  -She will discuss with her family and possibly will pursue EGD with MAC tomorrow if stable from cardiopulmonar

## 2019-11-21 NOTE — PROGRESS NOTES
[facility] Hospitalist Team  Progress Note    Mariel Finnegan Patient Status:  Inpatient    1935 MRN [id number]   Location [facility] 4W/SW/SE Attending [place] Yadav MD   Hosp Day # 2 PCP Pinky Elena MD     CC: Follow Up  PCP: Pinky Elena tramadol, CBD oil, acupuncture without success  -Continue Lyrica  -PRN Norco (given prescription as an outpatient has been reluctant to use)  -follows with Dr Zoey Saleh     CAD S/P PTCA  HTN  -2018 nuclear w/ normal perfusion  -hold losartan with low BP, hold GLU 97 82       No results for input(s): ALT, AST, ALB, AMYLASE, LIPASE, LDH in the last 168 hours. Invalid input(s): ALPHOS, TBIL, DBIL, TPROT    No results for input(s): PGLU in the last 168 hours.     No results for input(s): TROP in the last 168 ho distress, alert and oriented x3, no focal neurologic deficits  Heent: NC AT, mucous memb clear, PERRLA, neck supple  Pulm: Lungs clear, normal respiratory effort  CV: Heart with regular rate and rhythm, no peripheral edema  Abd: Abdomen soft, nontender, no

## 2019-11-21 NOTE — PLAN OF CARE
A&Ox4. Upx1 with staff, pt refusing use of walker. General diet, tolerating well. On 1L O2 via NC. Pt refusing SCDs, education provided. Productive cough, mucinex scheduled. L AC PIV SL. Bed alarm in place. Pt saw Dr. Abbey Agrawal over night.  Will receive venofe measures as available)  - Encourage oral intake as appropriate  - Instruct patient on fluid and nutrition restrictions as appropriate  Outcome: Progressing     Problem: HEMATOLOGIC - ADULT  Goal: Maintains hematologic stability  Description  INTERVENTIONS

## 2019-11-21 NOTE — PROGRESS NOTES
[facility]    Progress Note    Berta Nascimento Patient Status:  Inpatient    1935 MRN [id number]   Location [facility] 4W/SW/SE Attending Jorge Lóepz MD   Hosp Day # 1 PCP MD Deshawn Ashleynguyen Azam is an 83-yea decreased cough      Objective:   Blood pressure 104/35, pulse 65, temperature 98.1 °F (36.7 °C), temperature source Oral, resp. rate 18, height 1.6 m (5' 3\"), weight 40.4 kg (89 lb), SpO2 97 %. Physical Exam   Nursing note and vitals reviewed.    Const cancer  Patient has a history of multiple non-small cell lung cancers      Chronic respiratory failure  Patient was using O2 at home only at night.   She and her daughter were planning on using a portable O2 source on the plane 11/25/2019 to [place] for the consolidation. Blunting of the posterior costophrenic angles. Correlate clinically.     Dictated by (CST): Laci Lambert MD on 11/19/2019 at 9:16     Approved by (CST): Laci Lambert MD on 11/19/2019 at 9:21          Ekg 12-lead    Result Date: 11/19/20

## 2019-11-21 NOTE — PHYSICAL THERAPY NOTE
Attempted PT treatment however pt with a Hgb of 7.9 and is due to receive blood this afternoon. Collaborated with RN. Will check back at a later time as able/appropriate.

## 2019-11-22 NOTE — OPERATIVE REPORT
ESOPHAGOGASTRODUODENOSCOPY REPORT    Patient Name:  Asa Sensor Record #: [id number]  YOB: 1935  Date of Procedure: 11/22/2019    Referring physician: Jonathon Phillips MD    Surgeon:  Yady Fairchild MD    Pre-op diagnosis: Joshua Rich

## 2019-11-22 NOTE — PLAN OF CARE
Patient given 1 unit PRBC, repeat hgb 10.2 Ambulating with standby assist, could benefit from walker. Bed in lowest position and alarm on. Continued on 1L oxygen. Plan for GI procedure tomorrow, will be NPO at midnight.  Pt and daughter aware and in agreeme interventions for patient's volume status, including labs, urine output, blood pressure (other measures as available)  - Encourage oral intake as appropriate  - Instruct patient on fluid and nutrition restrictions as appropriate  Outcome: Progressing     P

## 2019-11-22 NOTE — PROGRESS NOTES
[facility] Hospitalist Team  Progress Note    Sim Roanoke Patient Status:  Inpatient    1935 MRN [id number]   Location [facility] 4W/SW/SE Attending Sharon Khan MD   Hosp Day # 3 PCP Pratibha Motley MD     CC: Follow Up  PCP: Pratibha Motley Stenosis  Neuropathy  -Has tried gabapentin, tramadol, CBD oil, acupuncture without success  -Continue Lyrica  -PRN Norco (given prescription as an outpatient has been reluctant to use)  -follows with Dr Eli Nicholson     CAD S/P PTCA  HTN  -2018 nuclear w/ tania 139 140 140   K 3.9 4.1 4.1    110 109   CO2 25.0 25.0 28.0   BUN 17 15 11   CREATSERUM 0.76 0.64 0.58   CA 8.4* 8.2* 8.4*   GLU 97 82 77       No results for input(s): ALT, AST, ALB, AMYLASE, LIPASE, LDH in the last 168 hours.     Invalid input(s): A

## 2019-11-22 NOTE — PLAN OF CARE
A&Ox4. Upx1 SB. General diet, NPO and midnight for EGD tomorrow, not on schedule yet or consent yet. Pt requesting to complete home O2 eval in AM. Endorsed oncoming RN. Pt refusing SCD, education provided. On 1L O2 via NC.  Pt. was seen by Dr. Liu Huge output, blood pressure (other measures as available)  - Encourage oral intake as appropriate  - Instruct patient on fluid and nutrition restrictions as appropriate  Outcome: Progressing     Problem: HEMATOLOGIC - ADULT  Goal: Maintains hematologic stabilit

## 2019-11-22 NOTE — PLAN OF CARE
Pt alert and oriented x4. VSS. On 1L of oxygen. 85% on room air. 97% on 1L at rest. 80% on 1L while ambulating. Titrated as appropriate. Discussed with  about home o2. Went down for an EGD today. Tolerating general diet, no nausea.  No complaint to interventions for patient's volume status, including labs, urine output, blood pressure (other measures as available)  - Encourage oral intake as appropriate  - Instruct patient on fluid and nutrition restrictions as appropriate  Outcome: Adequate for D

## 2019-11-22 NOTE — H&P
PRE-PROCEDURE UPDATE    HPI: Berta Nascimento is a 82year old female. 12/26/1935. Patient presents for an Esophagogastroduodenoscopy. Recurrent anemia, iron deficient, hx of NSAID use.  See consult note for details    ALLERGIES:   Sulfa Antibiotics PHYSICAL EXAM:  /46 (BP Location: Left arm)   Pulse 63   Temp 98.2 °F (36.8 °C) (Oral)   Resp 17   Ht 5' 3\" (1.6 m)   Wt 89 lb (40.4 kg)   SpO2 92%   BMI 15.77 kg/m²   LUNGS:   Clear to auscultation.   CARDIAC:   RRR, normal S1, S2; no S3 or mu

## 2019-11-22 NOTE — ANESTHESIA POSTPROCEDURE EVALUATION
Patient: Julián Marrero    Procedure Summary     Date:  11/22/19 Room / Location:  [facility] ENDOSCOPY 05 / [facility] ENDOSCOPY    Anesthesia Start:  1538 Anesthesia Stop:      Procedure:  ESOPHAGOGASTRODUODENOSCOPY (EGD) (N/A ) Diagnosis:  (Hiatal hernia)    Surgeon

## 2019-11-22 NOTE — DISCHARGE SUMMARY
[facility] Hospitalist Discharge Summary   Patient ID:  Berta Nasicmento  [id number]  87 year old  12/26/1935    Admit date: 11/19/2019  Discharge date: 11/22/2019    Primary Care Physician: Treva Gee MD   Attending Physician: Cordell Baker MD   Consults: Course:   51-year-old female with COPD, cervical spinal stenosis/PN, chronic hypoxemic resp failure-uses 2 L of oxygen at night, tobacco abuse, hypertension, CAD, iron deficiency anemia, adenocarcinoma of lung  status post CyberKnife 2012 now with small ce BP  -continue cardizem  -not on ASA or statin ? reason  -follows with Dr Radha Tirado     Anxiety/Depression  -continue lexapro     Severe Protein Calorie Malnutrition  -BMI15  -dietary consult     GO  -son Tao Councilman, lives in [facility]  -lives with Daughter Yesenia Wright up:  Follow-up Information     Germaine Rodriguez MD.    Specialties:  Jonathon Mathew  Why:  hospital follow up  Contact information:  [address] [facility] [address] [address]             Taisha Hancock MD.    Specialty:  MAT [facility]

## 2019-11-25 NOTE — TELEPHONE ENCOUNTER
Issa daughter called to speak with a nurse about blood test results that were taken earlier this day. They are looking to get the results so they can decide if they are going to be traveling for the holiday. Please call back with the results.

## 2019-11-25 NOTE — TELEPHONE ENCOUNTER
Spoke with [employer], pt's hgb 11.9-- per Dr. Jorgito Díaz ok to travel-- pt and dtr aware that is show shows s/s of anemia (weakness,pallor, SOB) to report to ED. Pt and dtr verbalize understanding.

## 2019-12-12 NOTE — PROGRESS NOTES
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   Patient completed radiation therapy to the left anterior lamina are epidural steroid injection 7/5/2019.   She was not thought to be a good candidate for surgery and declined surgery in the past.  Patient's weight loss from 100 pounds 11/8/2017 to her current 85 pounds is thought to be secondary to her c (CALCIUM 1200 OR) Take by mouth daily. • acetaminophen 500 MG Oral Tab Take 500 mg by mouth every 6 (six) hours as needed for Pain. • Probiotic Product (PROBIOTIC DAILY OR) Take by mouth daily.        • albuterol sulfate (2.5 MG/3ML) 0.083% Inhalati 53139, 89878 N/A 10/3/2013    Performed by Grisel Baker MD at [facility]   • OTHER SURGICAL HISTORY  2013, [place]    lung CA - cyberknife; Dr Nikki Matias   • UPPER GI ENDOSCOPY PERFORMED  10/3/13=Normal    SB Bx normal     Social History Sleep Concern: Not Asked        Stress Concern: Not Asked        Weight Concern: Not Asked        Special Diet: Not Asked        Back Care: Not Asked        Exercise: No        Bike Helmet: Not Asked        Seat Belt: Not Asked        Self-Exams: Not A deficiency anemia type  Chronic bronchitis, unspecified chronic bronchitis type (hcc)  Chemotherapy management, encounter for  Smoking addiction    Govind Rendon and her daughter Alisha Farrell are aware that NCCN guidelines initial treatment for limited small c pleomorphic adenoma  ===================================  9/20/2019 MRI brain  CONCLUSION:   1. No evidence of intracranial metastatic disease.   2. Mild-to-moderate presumed sequelae of chronic microangiopathy involving both cerebral hemispheres as well as 99 mg/dL 84      Sodium      136 - 145 mmol/L 141      Potassium      3.5 - 5.1 mmol/L 4.0      Chloride      98 - 112 mmol/L 107      Carbon Dioxide, Total      21.0 - 32.0 mmol/L 30.0      ANION GAP      0 - 18 mmol/L 4      BUN      7 - 18 mg/dL 7

## 2019-12-12 NOTE — TELEPHONE ENCOUNTER
Called with iron studies   Will give iron dextran   Patient previously admitted with GI blood loss   Please arrange for authorization and scheduling   Daughter will bring her

## 2019-12-12 NOTE — PROGRESS NOTES
Pt here for Merilynn Moles. Arrives Ambulating independently, accompanied by Family member, daughter         Modifications in dose or schedule: Yes, patient was delayed for several weeks due to being hospitalized for low hemoglobin.      Patient reports she

## 2020-01-01 ENCOUNTER — TELEPHONE (OUTPATIENT)
Dept: HEMATOLOGY/ONCOLOGY | Facility: HOSPITAL | Age: 85
End: 2020-01-01

## 2020-01-01 ENCOUNTER — OFFICE VISIT (OUTPATIENT)
Dept: HEMATOLOGY/ONCOLOGY | Facility: HOSPITAL | Age: 85
End: 2020-01-01
Attending: INTERNAL MEDICINE
Payer: MEDICARE

## 2020-01-01 ENCOUNTER — APPOINTMENT (OUTPATIENT)
Dept: GENERAL RADIOLOGY | Facility: HOSPITAL | Age: 85
DRG: 189 | End: 2020-01-01
Attending: EMERGENCY MEDICINE
Payer: MEDICARE

## 2020-01-01 ENCOUNTER — HOSPITAL ENCOUNTER (INPATIENT)
Facility: HOSPITAL | Age: 85
LOS: 9 days | DRG: 189 | End: 2020-01-01
Attending: HOSPITALIST | Admitting: HOSPITALIST
Payer: OTHER MISCELLANEOUS

## 2020-01-01 ENCOUNTER — APPOINTMENT (OUTPATIENT)
Dept: CT IMAGING | Facility: HOSPITAL | Age: 85
DRG: 189 | End: 2020-01-01
Attending: EMERGENCY MEDICINE
Payer: MEDICARE

## 2020-01-01 ENCOUNTER — HOSPITAL ENCOUNTER (INPATIENT)
Facility: HOSPITAL | Age: 85
LOS: 1 days | Discharge: HOSPICE | DRG: 189 | End: 2020-01-01
Attending: EMERGENCY MEDICINE | Admitting: INTERNAL MEDICINE
Payer: MEDICARE

## 2020-01-01 ENCOUNTER — DOCUMENTATION ONLY (OUTPATIENT)
Dept: RADIATION ONCOLOGY | Facility: HOSPITAL | Age: 85
End: 2020-01-01

## 2020-01-01 ENCOUNTER — HOSPITAL ENCOUNTER (EMERGENCY)
Facility: HOSPITAL | Age: 85
Discharge: HOME | DRG: 189 | End: 2020-01-01
Attending: EMERGENCY MEDICINE
Payer: MEDICARE

## 2020-01-01 ENCOUNTER — ANESTHESIA (OUTPATIENT)
Dept: SURGERY | Facility: HOSPITAL | Age: 85
End: 2020-01-01
Payer: MEDICARE

## 2020-01-01 ENCOUNTER — HOSPITAL ENCOUNTER (OUTPATIENT)
Dept: CT IMAGING | Facility: HOSPITAL | Age: 85
Discharge: HOME | End: 2020-01-01
Attending: RADIOLOGY
Payer: MEDICARE

## 2020-01-01 ENCOUNTER — APPOINTMENT (OUTPATIENT)
Dept: GENERAL RADIOLOGY | Facility: HOSPITAL | Age: 85
DRG: 189 | End: 2020-01-01
Payer: MEDICARE

## 2020-01-01 ENCOUNTER — APPOINTMENT (OUTPATIENT)
Dept: RADIATION ONCOLOGY | Facility: HOSPITAL | Age: 85
End: 2020-01-01
Attending: RADIOLOGY
Payer: MEDICARE

## 2020-01-01 ENCOUNTER — HOSPITAL ENCOUNTER (OUTPATIENT)
Facility: HOSPITAL | Age: 85
Setting detail: HOSPITAL OUTPATIENT SURGERY
Discharge: HOME | End: 2020-01-01
Attending: SURGERY | Admitting: SURGERY
Payer: MEDICARE

## 2020-01-01 ENCOUNTER — ANESTHESIA EVENT (OUTPATIENT)
Dept: SURGERY | Facility: HOSPITAL | Age: 85
End: 2020-01-01
Payer: MEDICARE

## 2020-01-01 VITALS
WEIGHT: 86 LBS | BODY MASS INDEX: 16 KG/M2 | HEART RATE: 76 BPM | SYSTOLIC BLOOD PRESSURE: 143 MMHG | TEMPERATURE: 98 F | DIASTOLIC BLOOD PRESSURE: 112 MMHG | RESPIRATION RATE: 16 BRPM | OXYGEN SATURATION: 96 %

## 2020-01-01 VITALS
RESPIRATION RATE: 25 BRPM | SYSTOLIC BLOOD PRESSURE: 101 MMHG | HEART RATE: 70 BPM | DIASTOLIC BLOOD PRESSURE: 86 MMHG | OXYGEN SATURATION: 90 % | TEMPERATURE: 98 F

## 2020-01-01 VITALS
HEART RATE: 69 BPM | DIASTOLIC BLOOD PRESSURE: 53 MMHG | RESPIRATION RATE: 18 BRPM | HEIGHT: 62 IN | SYSTOLIC BLOOD PRESSURE: 119 MMHG | BODY MASS INDEX: 15.27 KG/M2 | TEMPERATURE: 98 F | WEIGHT: 83 LBS | OXYGEN SATURATION: 96 %

## 2020-01-01 VITALS
TEMPERATURE: 98 F | BODY MASS INDEX: 16 KG/M2 | SYSTOLIC BLOOD PRESSURE: 129 MMHG | RESPIRATION RATE: 16 BRPM | WEIGHT: 86 LBS | DIASTOLIC BLOOD PRESSURE: 67 MMHG | OXYGEN SATURATION: 90 % | HEART RATE: 77 BPM

## 2020-01-01 VITALS
DIASTOLIC BLOOD PRESSURE: 54 MMHG | RESPIRATION RATE: 24 BRPM | BODY MASS INDEX: 15.83 KG/M2 | WEIGHT: 86 LBS | SYSTOLIC BLOOD PRESSURE: 107 MMHG | HEIGHT: 62 IN | HEART RATE: 80 BPM | OXYGEN SATURATION: 94 % | TEMPERATURE: 99 F

## 2020-01-01 VITALS
SYSTOLIC BLOOD PRESSURE: 129 MMHG | BODY MASS INDEX: 16 KG/M2 | WEIGHT: 86 LBS | DIASTOLIC BLOOD PRESSURE: 67 MMHG | HEART RATE: 77 BPM | TEMPERATURE: 98 F | OXYGEN SATURATION: 90 % | RESPIRATION RATE: 16 BRPM

## 2020-01-01 VITALS
WEIGHT: 86 LBS | RESPIRATION RATE: 20 BRPM | HEART RATE: 99 BPM | OXYGEN SATURATION: 94 % | BODY MASS INDEX: 16 KG/M2 | TEMPERATURE: 101 F | DIASTOLIC BLOOD PRESSURE: 47 MMHG | SYSTOLIC BLOOD PRESSURE: 89 MMHG

## 2020-01-01 VITALS
RESPIRATION RATE: 16 BRPM | TEMPERATURE: 98 F | DIASTOLIC BLOOD PRESSURE: 49 MMHG | OXYGEN SATURATION: 91 % | SYSTOLIC BLOOD PRESSURE: 102 MMHG | HEART RATE: 74 BPM

## 2020-01-01 VITALS
HEIGHT: 62 IN | DIASTOLIC BLOOD PRESSURE: 65 MMHG | OXYGEN SATURATION: 91 % | BODY MASS INDEX: 15.64 KG/M2 | WEIGHT: 85 LBS | HEART RATE: 71 BPM | RESPIRATION RATE: 16 BRPM | SYSTOLIC BLOOD PRESSURE: 127 MMHG | TEMPERATURE: 98 F

## 2020-01-01 DIAGNOSIS — Z72.0 TOBACCO ABUSE DISORDER: ICD-10-CM

## 2020-01-01 DIAGNOSIS — R79.89 ABNORMAL TSH: Primary | ICD-10-CM

## 2020-01-01 DIAGNOSIS — Z71.89 GOALS OF CARE, COUNSELING/DISCUSSION: ICD-10-CM

## 2020-01-01 DIAGNOSIS — R22.2 MASS OF CHEST WALL, RIGHT: ICD-10-CM

## 2020-01-01 DIAGNOSIS — C34.92 SMALL CELL LUNG CANCER, LEFT (HCC): Primary | ICD-10-CM

## 2020-01-01 DIAGNOSIS — D50.0 IRON DEFICIENCY ANEMIA DUE TO CHRONIC BLOOD LOSS: ICD-10-CM

## 2020-01-01 DIAGNOSIS — M79.642 PAIN IN LEFT HAND: ICD-10-CM

## 2020-01-01 DIAGNOSIS — F32.9 REACTIVE DEPRESSION: ICD-10-CM

## 2020-01-01 DIAGNOSIS — G62.9 NEUROPATHY: Primary | ICD-10-CM

## 2020-01-01 DIAGNOSIS — S22.31XA CLOSED FRACTURE OF ONE RIB OF RIGHT SIDE, INITIAL ENCOUNTER: Primary | ICD-10-CM

## 2020-01-01 DIAGNOSIS — C34.92 SMALL CELL LUNG CANCER, LEFT (HCC): ICD-10-CM

## 2020-01-01 DIAGNOSIS — C34.90 LUNG CANCER (HCC): ICD-10-CM

## 2020-01-01 DIAGNOSIS — C34.92 ADENOCARCINOMA OF LEFT LUNG, STAGE 1 (HCC): ICD-10-CM

## 2020-01-01 DIAGNOSIS — R79.89 ABNORMAL TSH: ICD-10-CM

## 2020-01-01 DIAGNOSIS — S22.31XA CLOSED FRACTURE OF ONE RIB OF RIGHT SIDE, INITIAL ENCOUNTER: ICD-10-CM

## 2020-01-01 DIAGNOSIS — J96.21 ACUTE ON CHRONIC RESPIRATORY FAILURE WITH HYPOXIA (HCC): Primary | ICD-10-CM

## 2020-01-01 DIAGNOSIS — Z51.11 CHEMOTHERAPY MANAGEMENT, ENCOUNTER FOR: ICD-10-CM

## 2020-01-01 DIAGNOSIS — Z51.81 MEDICATION MONITORING ENCOUNTER: ICD-10-CM

## 2020-01-01 DIAGNOSIS — Z71.89 ADVANCE CARE PLANNING: ICD-10-CM

## 2020-01-01 DIAGNOSIS — D50.9 IRON DEFICIENCY ANEMIA, UNSPECIFIED IRON DEFICIENCY ANEMIA TYPE: ICD-10-CM

## 2020-01-01 DIAGNOSIS — C34.90 LUNG CANCER (HCC): Primary | ICD-10-CM

## 2020-01-01 LAB
ALBUMIN SERPL-MCNC: 3.1 G/DL (ref 3.4–5)
ALBUMIN SERPL-MCNC: 3.5 G/DL (ref 3.4–5)
ALBUMIN SERPL-MCNC: 3.8 G/DL (ref 3.4–5)
ALBUMIN/GLOB SERPL: 0.7 {RATIO} (ref 1–2)
ALBUMIN/GLOB SERPL: 0.8 {RATIO} (ref 1–2)
ALBUMIN/GLOB SERPL: 0.8 {RATIO} (ref 1–2)
ALP LIVER SERPL-CCNC: 88 U/L (ref 55–142)
ALP LIVER SERPL-CCNC: 95 U/L (ref 55–142)
ALP LIVER SERPL-CCNC: 96 U/L (ref 55–142)
ALT SERPL-CCNC: 10 U/L (ref 13–56)
ALT SERPL-CCNC: 13 U/L (ref 13–56)
ALT SERPL-CCNC: 8 U/L (ref 13–56)
ANION GAP SERPL CALC-SCNC: 2 MMOL/L (ref 0–18)
ANION GAP SERPL CALC-SCNC: 2 MMOL/L (ref 0–18)
ANION GAP SERPL CALC-SCNC: 3 MMOL/L (ref 0–18)
ANION GAP SERPL CALC-SCNC: 4 MMOL/L (ref 0–18)
ANION GAP SERPL CALC-SCNC: 4 MMOL/L (ref 0–18)
AST SERPL-CCNC: 13 U/L (ref 15–37)
AST SERPL-CCNC: 15 U/L (ref 15–37)
AST SERPL-CCNC: 16 U/L (ref 15–37)
BASOPHILS # BLD AUTO: 0.04 X10(3) UL (ref 0–0.2)
BASOPHILS # BLD AUTO: 0.05 X10(3) UL (ref 0–0.2)
BASOPHILS # BLD AUTO: 0.06 X10(3) UL (ref 0–0.2)
BASOPHILS NFR BLD AUTO: 0.4 %
BASOPHILS NFR BLD AUTO: 0.7 %
BASOPHILS NFR BLD AUTO: 0.9 %
BASOPHILS NFR BLD AUTO: 1.3 %
BASOPHILS NFR BLD AUTO: 1.4 %
BILIRUB SERPL-MCNC: 0.4 MG/DL (ref 0.1–2)
BUN BLD-MCNC: 11 MG/DL (ref 7–18)
BUN BLD-MCNC: 12 MG/DL (ref 7–18)
BUN BLD-MCNC: 13 MG/DL (ref 7–18)
BUN BLD-MCNC: 13 MG/DL (ref 7–18)
BUN BLD-MCNC: 16 MG/DL (ref 7–18)
BUN/CREAT SERPL: 15.9 (ref 10–20)
BUN/CREAT SERPL: 16.9 (ref 10–20)
BUN/CREAT SERPL: 19.1 (ref 10–20)
BUN/CREAT SERPL: 19.1 (ref 10–20)
BUN/CREAT SERPL: 22.2 (ref 10–20)
CALCIUM BLD-MCNC: 9.1 MG/DL (ref 8.5–10.1)
CALCIUM BLD-MCNC: 9.1 MG/DL (ref 8.5–10.1)
CALCIUM BLD-MCNC: 9.4 MG/DL (ref 8.5–10.1)
CALCIUM BLD-MCNC: 9.4 MG/DL (ref 8.5–10.1)
CALCIUM BLD-MCNC: 9.5 MG/DL (ref 8.5–10.1)
CHLORIDE SERPL-SCNC: 105 MMOL/L (ref 98–112)
CHLORIDE SERPL-SCNC: 105 MMOL/L (ref 98–112)
CHLORIDE SERPL-SCNC: 106 MMOL/L (ref 98–112)
CHLORIDE SERPL-SCNC: 106 MMOL/L (ref 98–112)
CHLORIDE SERPL-SCNC: 107 MMOL/L (ref 98–112)
CO2 SERPL-SCNC: 29 MMOL/L (ref 21–32)
CO2 SERPL-SCNC: 30 MMOL/L (ref 21–32)
CO2 SERPL-SCNC: 32 MMOL/L (ref 21–32)
CREAT BLD-MCNC: 0.68 MG/DL (ref 0.55–1.02)
CREAT BLD-MCNC: 0.68 MG/DL (ref 0.55–1.02)
CREAT BLD-MCNC: 0.69 MG/DL (ref 0.55–1.02)
CREAT BLD-MCNC: 0.71 MG/DL (ref 0.55–1.02)
CREAT BLD-MCNC: 0.72 MG/DL (ref 0.55–1.02)
DEPRECATED RDW RBC AUTO: 58.3 FL (ref 35.1–46.3)
DEPRECATED RDW RBC AUTO: 60.2 FL (ref 35.1–46.3)
DEPRECATED RDW RBC AUTO: 61.6 FL (ref 35.1–46.3)
DEPRECATED RDW RBC AUTO: 64.5 FL (ref 35.1–46.3)
DEPRECATED RDW RBC AUTO: 64.8 FL (ref 35.1–46.3)
EOSINOPHIL # BLD AUTO: 0.01 X10(3) UL (ref 0–0.7)
EOSINOPHIL # BLD AUTO: 0.02 X10(3) UL (ref 0–0.7)
EOSINOPHIL # BLD AUTO: 0.07 X10(3) UL (ref 0–0.7)
EOSINOPHIL NFR BLD AUTO: 0.2 %
EOSINOPHIL NFR BLD AUTO: 0.2 %
EOSINOPHIL NFR BLD AUTO: 0.4 %
EOSINOPHIL NFR BLD AUTO: 0.5 %
EOSINOPHIL NFR BLD AUTO: 0.9 %
ERYTHROCYTE [DISTWIDTH] IN BLOOD BY AUTOMATED COUNT: 17.6 % (ref 11–15)
ERYTHROCYTE [DISTWIDTH] IN BLOOD BY AUTOMATED COUNT: 18.1 % (ref 11–15)
ERYTHROCYTE [DISTWIDTH] IN BLOOD BY AUTOMATED COUNT: 18.1 % (ref 11–15)
ERYTHROCYTE [DISTWIDTH] IN BLOOD BY AUTOMATED COUNT: 18.7 % (ref 11–15)
ERYTHROCYTE [DISTWIDTH] IN BLOOD BY AUTOMATED COUNT: 18.7 % (ref 11–15)
GLOBULIN PLAS-MCNC: 4.4 G/DL (ref 2.8–4.4)
GLOBULIN PLAS-MCNC: 4.5 G/DL (ref 2.8–4.4)
GLOBULIN PLAS-MCNC: 4.5 G/DL (ref 2.8–4.4)
GLUCOSE BLD-MCNC: 114 MG/DL (ref 70–99)
GLUCOSE BLD-MCNC: 74 MG/DL (ref 70–99)
GLUCOSE BLD-MCNC: 83 MG/DL (ref 70–99)
GLUCOSE BLD-MCNC: 86 MG/DL (ref 70–99)
GLUCOSE BLD-MCNC: 88 MG/DL (ref 70–99)
HCT VFR BLD AUTO: 36.4 % (ref 35–48)
HCT VFR BLD AUTO: 38.7 % (ref 35–48)
HCT VFR BLD AUTO: 40 % (ref 35–48)
HCT VFR BLD AUTO: 40.3 % (ref 35–48)
HCT VFR BLD AUTO: 43 % (ref 35–48)
HGB BLD-MCNC: 11 G/DL (ref 12–16)
HGB BLD-MCNC: 11.6 G/DL (ref 12–16)
HGB BLD-MCNC: 11.8 G/DL (ref 12–16)
HGB BLD-MCNC: 12 G/DL (ref 12–16)
HGB BLD-MCNC: 12.9 G/DL (ref 12–16)
IMM GRANULOCYTES # BLD AUTO: 0.02 X10(3) UL (ref 0–1)
IMM GRANULOCYTES # BLD AUTO: 0.02 X10(3) UL (ref 0–1)
IMM GRANULOCYTES # BLD AUTO: 0.03 X10(3) UL (ref 0–1)
IMM GRANULOCYTES # BLD AUTO: 0.05 X10(3) UL (ref 0–1)
IMM GRANULOCYTES # BLD AUTO: 0.05 X10(3) UL (ref 0–1)
IMM GRANULOCYTES NFR BLD: 0.4 %
IMM GRANULOCYTES NFR BLD: 0.5 %
IMM GRANULOCYTES NFR BLD: 0.6 %
LYMPHOCYTES # BLD AUTO: 0.55 X10(3) UL (ref 1–4)
LYMPHOCYTES # BLD AUTO: 0.68 X10(3) UL (ref 1–4)
LYMPHOCYTES # BLD AUTO: 0.72 X10(3) UL (ref 1–4)
LYMPHOCYTES # BLD AUTO: 0.9 X10(3) UL (ref 1–4)
LYMPHOCYTES # BLD AUTO: 0.93 X10(3) UL (ref 1–4)
LYMPHOCYTES NFR BLD AUTO: 10.9 %
LYMPHOCYTES NFR BLD AUTO: 12.1 %
LYMPHOCYTES NFR BLD AUTO: 13.3 %
LYMPHOCYTES NFR BLD AUTO: 15.2 %
LYMPHOCYTES NFR BLD AUTO: 8.8 %
M PROTEIN MFR SERPL ELPH: 7.5 G/DL (ref 6.4–8.2)
M PROTEIN MFR SERPL ELPH: 8 G/DL (ref 6.4–8.2)
M PROTEIN MFR SERPL ELPH: 8.3 G/DL (ref 6.4–8.2)
MCH RBC QN AUTO: 27.3 PG (ref 26–34)
MCH RBC QN AUTO: 27.4 PG (ref 26–34)
MCH RBC QN AUTO: 27.7 PG (ref 26–34)
MCH RBC QN AUTO: 28 PG (ref 26–34)
MCH RBC QN AUTO: 28.2 PG (ref 26–34)
MCHC RBC AUTO-ENTMCNC: 29.5 G/DL (ref 31–37)
MCHC RBC AUTO-ENTMCNC: 29.8 G/DL (ref 31–37)
MCHC RBC AUTO-ENTMCNC: 30 G/DL (ref 31–37)
MCHC RBC AUTO-ENTMCNC: 30 G/DL (ref 31–37)
MCHC RBC AUTO-ENTMCNC: 30.2 G/DL (ref 31–37)
MCV RBC AUTO: 91.5 FL (ref 80–100)
MCV RBC AUTO: 91.8 FL (ref 80–100)
MCV RBC AUTO: 92.6 FL (ref 80–100)
MCV RBC AUTO: 93.9 FL (ref 80–100)
MCV RBC AUTO: 93.9 FL (ref 80–100)
MONOCYTES # BLD AUTO: 0.33 X10(3) UL (ref 0.1–1)
MONOCYTES # BLD AUTO: 0.36 X10(3) UL (ref 0.1–1)
MONOCYTES # BLD AUTO: 0.54 X10(3) UL (ref 0.1–1)
MONOCYTES # BLD AUTO: 0.56 X10(3) UL (ref 0.1–1)
MONOCYTES # BLD AUTO: 0.79 X10(3) UL (ref 0.1–1)
MONOCYTES NFR BLD AUTO: 11.4 %
MONOCYTES NFR BLD AUTO: 6.4 %
MONOCYTES NFR BLD AUTO: 6.8 %
MONOCYTES NFR BLD AUTO: 7.5 %
MONOCYTES NFR BLD AUTO: 8 %
NEUTROPHILS # BLD AUTO: 3.16 X10 (3) UL (ref 1.5–7.7)
NEUTROPHILS # BLD AUTO: 3.16 X10(3) UL (ref 1.5–7.7)
NEUTROPHILS # BLD AUTO: 3.4 X10 (3) UL (ref 1.5–7.7)
NEUTROPHILS # BLD AUTO: 3.4 X10(3) UL (ref 1.5–7.7)
NEUTROPHILS # BLD AUTO: 4.49 X10 (3) UL (ref 1.5–7.7)
NEUTROPHILS # BLD AUTO: 4.49 X10(3) UL (ref 1.5–7.7)
NEUTROPHILS # BLD AUTO: 6.58 X10 (3) UL (ref 1.5–7.7)
NEUTROPHILS # BLD AUTO: 6.58 X10(3) UL (ref 1.5–7.7)
NEUTROPHILS # BLD AUTO: 8.76 X10 (3) UL (ref 1.5–7.7)
NEUTROPHILS # BLD AUTO: 8.76 X10(3) UL (ref 1.5–7.7)
NEUTROPHILS NFR BLD AUTO: 71.5 %
NEUTROPHILS NFR BLD AUTO: 76.3 %
NEUTROPHILS NFR BLD AUTO: 79.7 %
NEUTROPHILS NFR BLD AUTO: 80.1 %
NEUTROPHILS NFR BLD AUTO: 82.6 %
OSMOLALITY SERPL CALC.SUM OF ELEC: 287 MOSM/KG (ref 275–295)
OSMOLALITY SERPL CALC.SUM OF ELEC: 287 MOSM/KG (ref 275–295)
OSMOLALITY SERPL CALC.SUM OF ELEC: 289 MOSM/KG (ref 275–295)
OSMOLALITY SERPL CALC.SUM OF ELEC: 289 MOSM/KG (ref 275–295)
OSMOLALITY SERPL CALC.SUM OF ELEC: 294 MOSM/KG (ref 275–295)
PATIENT FASTING Y/N/NP: NO
PATIENT FASTING Y/N/NP: NO
PLATELET # BLD AUTO: 115 10(3)UL (ref 150–450)
PLATELET # BLD AUTO: 131 10(3)UL (ref 150–450)
PLATELET # BLD AUTO: 140 10(3)UL (ref 150–450)
PLATELET # BLD AUTO: 153 10(3)UL (ref 150–450)
PLATELET # BLD AUTO: 154 10(3)UL (ref 150–450)
POTASSIUM SERPL-SCNC: 3.8 MMOL/L (ref 3.5–5.1)
POTASSIUM SERPL-SCNC: 3.9 MMOL/L (ref 3.5–5.1)
POTASSIUM SERPL-SCNC: 4.2 MMOL/L (ref 3.5–5.1)
POTASSIUM SERPL-SCNC: 4.5 MMOL/L (ref 3.5–5.1)
POTASSIUM SERPL-SCNC: 4.6 MMOL/L (ref 3.5–5.1)
PROCALCITONIN SERPL-MCNC: 0.08 NG/ML
RBC # BLD AUTO: 3.93 X10(6)UL (ref 3.8–5.3)
RBC # BLD AUTO: 4.23 X10(6)UL (ref 3.8–5.3)
RBC # BLD AUTO: 4.26 X10(6)UL (ref 3.8–5.3)
RBC # BLD AUTO: 4.39 X10(6)UL (ref 3.8–5.3)
RBC # BLD AUTO: 4.58 X10(6)UL (ref 3.8–5.3)
SODIUM SERPL-SCNC: 139 MMOL/L (ref 136–145)
SODIUM SERPL-SCNC: 139 MMOL/L (ref 136–145)
SODIUM SERPL-SCNC: 140 MMOL/L (ref 136–145)
SODIUM SERPL-SCNC: 140 MMOL/L (ref 136–145)
SODIUM SERPL-SCNC: 141 MMOL/L (ref 136–145)
T3FREE SERPL-MCNC: 2.61 PG/ML (ref 2.4–4.2)
T4 FREE SERPL-MCNC: 1 NG/DL (ref 0.8–1.7)
TSI SER-ACNC: 5.59 MIU/ML (ref 0.36–3.74)
TSI SER-ACNC: 6.38 MIU/ML (ref 0.36–3.74)
WBC # BLD AUTO: 10.6 X10(3) UL (ref 4–11)
WBC # BLD AUTO: 4.1 X10(3) UL (ref 4–11)
WBC # BLD AUTO: 4.8 X10(3) UL (ref 4–11)
WBC # BLD AUTO: 5.6 X10(3) UL (ref 4–11)
WBC # BLD AUTO: 8.2 X10(3) UL (ref 4–11)

## 2020-01-01 PROCEDURE — 84443 ASSAY THYROID STIM HORMONE: CPT

## 2020-01-01 PROCEDURE — 93010 ELECTROCARDIOGRAM REPORT: CPT | Performed by: EMERGENCY MEDICINE

## 2020-01-01 PROCEDURE — 96366 THER/PROPH/DIAG IV INF ADDON: CPT

## 2020-01-01 PROCEDURE — 99215 OFFICE O/P EST HI 40 MIN: CPT | Performed by: INTERNAL MEDICINE

## 2020-01-01 PROCEDURE — 5A09357 ASSISTANCE WITH RESPIRATORY VENTILATION, LESS THAN 24 CONSECUTIVE HOURS, CONTINUOUS POSITIVE AIRWAY PRESSURE: ICD-10-PCS | Performed by: EMERGENCY MEDICINE

## 2020-01-01 PROCEDURE — 99221 1ST HOSP IP/OBS SF/LOW 40: CPT | Performed by: INTERNAL MEDICINE

## 2020-01-01 PROCEDURE — 96413 CHEMO IV INFUSION 1 HR: CPT

## 2020-01-01 PROCEDURE — 80053 COMPREHEN METABOLIC PANEL: CPT

## 2020-01-01 PROCEDURE — 85025 COMPLETE CBC W/AUTO DIFF WBC: CPT | Performed by: EMERGENCY MEDICINE

## 2020-01-01 PROCEDURE — 85025 COMPLETE CBC W/AUTO DIFF WBC: CPT

## 2020-01-01 PROCEDURE — 99285 EMERGENCY DEPT VISIT HI MDM: CPT

## 2020-01-01 PROCEDURE — 88305 TISSUE EXAM BY PATHOLOGIST: CPT | Performed by: SURGERY

## 2020-01-01 PROCEDURE — 70450 CT HEAD/BRAIN W/O DYE: CPT | Performed by: EMERGENCY MEDICINE

## 2020-01-01 PROCEDURE — 88341 IMHCHEM/IMCYTCHM EA ADD ANTB: CPT | Performed by: SURGERY

## 2020-01-01 PROCEDURE — 96376 TX/PRO/DX INJ SAME DRUG ADON: CPT

## 2020-01-01 PROCEDURE — 93005 ELECTROCARDIOGRAM TRACING: CPT

## 2020-01-01 PROCEDURE — 96367 TX/PROPH/DG ADDL SEQ IV INF: CPT

## 2020-01-01 PROCEDURE — 84439 ASSAY OF FREE THYROXINE: CPT

## 2020-01-01 PROCEDURE — 80048 BASIC METABOLIC PNL TOTAL CA: CPT | Performed by: EMERGENCY MEDICINE

## 2020-01-01 PROCEDURE — 99215 OFFICE O/P EST HI 40 MIN: CPT | Performed by: PHYSICIAN ASSISTANT

## 2020-01-01 PROCEDURE — 36415 COLL VENOUS BLD VENIPUNCTURE: CPT

## 2020-01-01 PROCEDURE — 0JB60ZZ EXCISION OF CHEST SUBCUTANEOUS TISSUE AND FASCIA, OPEN APPROACH: ICD-10-PCS | Performed by: SURGERY

## 2020-01-01 PROCEDURE — 94640 AIRWAY INHALATION TREATMENT: CPT

## 2020-01-01 PROCEDURE — 71101 X-RAY EXAM UNILAT RIBS/CHEST: CPT | Performed by: EMERGENCY MEDICINE

## 2020-01-01 PROCEDURE — 84481 FREE ASSAY (FT-3): CPT

## 2020-01-01 PROCEDURE — 0JQ60ZZ REPAIR CHEST SUBCUTANEOUS TISSUE AND FASCIA, OPEN APPROACH: ICD-10-PCS | Performed by: SURGERY

## 2020-01-01 PROCEDURE — 71045 X-RAY EXAM CHEST 1 VIEW: CPT

## 2020-01-01 PROCEDURE — 96374 THER/PROPH/DIAG INJ IV PUSH: CPT

## 2020-01-01 PROCEDURE — 88342 IMHCHEM/IMCYTCHM 1ST ANTB: CPT | Performed by: SURGERY

## 2020-01-01 PROCEDURE — 99214 OFFICE O/P EST MOD 30 MIN: CPT | Performed by: NURSE PRACTITIONER

## 2020-01-01 PROCEDURE — 71250 CT THORAX DX C-: CPT | Performed by: RADIOLOGY

## 2020-01-01 RX ORDER — SODIUM CHLORIDE 0.9 % (FLUSH) 0.9 %
3 SYRINGE (ML) INJECTION AS NEEDED
Status: DISCONTINUED | OUTPATIENT
Start: 2020-01-01 | End: 2020-01-01

## 2020-01-01 RX ORDER — MORPHINE SULFATE 4 MG/ML
2 INJECTION, SOLUTION INTRAMUSCULAR; INTRAVENOUS ONCE
Status: COMPLETED | OUTPATIENT
Start: 2020-01-01 | End: 2020-01-01

## 2020-01-01 RX ORDER — MIDAZOLAM HYDROCHLORIDE 1 MG/ML
INJECTION INTRAMUSCULAR; INTRAVENOUS AS NEEDED
Status: DISCONTINUED | OUTPATIENT
Start: 2020-01-01 | End: 2020-01-01 | Stop reason: SURG

## 2020-01-01 RX ORDER — PROCHLORPERAZINE EDISYLATE 5 MG/ML
5 INJECTION INTRAMUSCULAR; INTRAVENOUS ONCE AS NEEDED
Status: DISCONTINUED | OUTPATIENT
Start: 2020-01-01 | End: 2020-01-01

## 2020-01-01 RX ORDER — SODIUM CHLORIDE, SODIUM LACTATE, POTASSIUM CHLORIDE, CALCIUM CHLORIDE 600; 310; 30; 20 MG/100ML; MG/100ML; MG/100ML; MG/100ML
INJECTION, SOLUTION INTRAVENOUS CONTINUOUS
Status: DISCONTINUED | OUTPATIENT
Start: 2020-01-01 | End: 2020-01-01

## 2020-01-01 RX ORDER — HYDROCODONE BITARTRATE AND ACETAMINOPHEN 5; 325 MG/1; MG/1
1 TABLET ORAL EVERY 6 HOURS PRN
Qty: 16 TABLET | Refills: 0 | Status: ON HOLD | OUTPATIENT
Start: 2020-01-01 | End: 2020-01-01

## 2020-01-01 RX ORDER — ONDANSETRON 2 MG/ML
4 INJECTION INTRAMUSCULAR; INTRAVENOUS EVERY 6 HOURS PRN
Status: DISCONTINUED | OUTPATIENT
Start: 2020-01-01 | End: 2020-01-01

## 2020-01-01 RX ORDER — METHYLPREDNISOLONE SODIUM SUCCINATE 40 MG/ML
40 INJECTION, POWDER, LYOPHILIZED, FOR SOLUTION INTRAMUSCULAR; INTRAVENOUS EVERY 12 HOURS
Status: DISCONTINUED | OUTPATIENT
Start: 2020-01-01 | End: 2020-01-01 | Stop reason: SDUPTHER

## 2020-01-01 RX ORDER — FUROSEMIDE 10 MG/ML
40 INJECTION INTRAMUSCULAR; INTRAVENOUS EVERY 8 HOURS PRN
Status: DISCONTINUED | OUTPATIENT
Start: 2020-01-01 | End: 2020-01-01

## 2020-01-01 RX ORDER — IPRATROPIUM BROMIDE AND ALBUTEROL SULFATE 2.5; .5 MG/3ML; MG/3ML
3 SOLUTION RESPIRATORY (INHALATION) EVERY 4 HOURS PRN
Status: DISCONTINUED | OUTPATIENT
Start: 2020-01-01 | End: 2020-01-01 | Stop reason: SDUPTHER

## 2020-01-01 RX ORDER — BUPIVACAINE HYDROCHLORIDE AND EPINEPHRINE 2.5; 5 MG/ML; UG/ML
INJECTION, SOLUTION INFILTRATION; PERINEURAL AS NEEDED
Status: DISCONTINUED | OUTPATIENT
Start: 2020-01-01 | End: 2020-01-01 | Stop reason: HOSPADM

## 2020-01-01 RX ORDER — SODIUM CHLORIDE 9 MG/ML
INJECTION, SOLUTION INTRAVENOUS CONTINUOUS
Status: DISCONTINUED | OUTPATIENT
Start: 2020-01-01 | End: 2020-01-01

## 2020-01-01 RX ORDER — HALOPERIDOL 5 MG/ML
1 INJECTION INTRAMUSCULAR
Status: DISCONTINUED | OUTPATIENT
Start: 2020-01-01 | End: 2020-01-01

## 2020-01-01 RX ORDER — HYDROMORPHONE HYDROCHLORIDE 1 MG/ML
0.4 INJECTION, SOLUTION INTRAMUSCULAR; INTRAVENOUS; SUBCUTANEOUS EVERY 5 MIN PRN
Status: DISCONTINUED | OUTPATIENT
Start: 2020-01-01 | End: 2020-01-01

## 2020-01-01 RX ORDER — LEVOFLOXACIN 750 MG/1
750 TABLET ORAL
Status: DISCONTINUED | OUTPATIENT
Start: 2020-01-01 | End: 2020-01-01

## 2020-01-01 RX ORDER — HYDROCODONE BITARTRATE AND ACETAMINOPHEN 5; 325 MG/1; MG/1
1 TABLET ORAL AS NEEDED
Status: DISCONTINUED | OUTPATIENT
Start: 2020-01-01 | End: 2020-01-01

## 2020-01-01 RX ORDER — ONDANSETRON 2 MG/ML
4 INJECTION INTRAMUSCULAR; INTRAVENOUS ONCE AS NEEDED
Status: DISCONTINUED | OUTPATIENT
Start: 2020-01-01 | End: 2020-01-01

## 2020-01-01 RX ORDER — MORPHINE SULFATE 10 MG/ML
6 INJECTION, SOLUTION INTRAMUSCULAR; INTRAVENOUS EVERY 10 MIN PRN
Status: DISCONTINUED | OUTPATIENT
Start: 2020-01-01 | End: 2020-01-01

## 2020-01-01 RX ORDER — MORPHINE SULFATE IN 0.9 % NACL 1 MG/ML
1 PLASTIC BAG, INJECTION (ML) INTRAVENOUS CONTINUOUS PRN
Status: DISCONTINUED | OUTPATIENT
Start: 2020-01-01 | End: 2020-01-01

## 2020-01-01 RX ORDER — MORPHINE SULFATE 4 MG/ML
4 INJECTION, SOLUTION INTRAMUSCULAR; INTRAVENOUS ONCE
Status: COMPLETED | OUTPATIENT
Start: 2020-01-01 | End: 2020-01-01

## 2020-01-01 RX ORDER — LIDOCAINE HYDROCHLORIDE 10 MG/ML
INJECTION, SOLUTION EPIDURAL; INFILTRATION; INTRACAUDAL; PERINEURAL AS NEEDED
Status: DISCONTINUED | OUTPATIENT
Start: 2020-01-01 | End: 2020-01-01 | Stop reason: SURG

## 2020-01-01 RX ORDER — LORAZEPAM 2 MG/ML
1 INJECTION INTRAMUSCULAR
Status: DISCONTINUED | OUTPATIENT
Start: 2020-01-01 | End: 2020-01-01

## 2020-01-01 RX ORDER — ATROPINE SULFATE 10 MG/ML
2 SOLUTION/ DROPS OPHTHALMIC EVERY 2 HOUR PRN
Status: DISCONTINUED | OUTPATIENT
Start: 2020-01-01 | End: 2020-01-01

## 2020-01-01 RX ORDER — MORPHINE SULFATE 4 MG/ML
2 INJECTION, SOLUTION INTRAMUSCULAR; INTRAVENOUS EVERY 10 MIN PRN
Status: DISCONTINUED | OUTPATIENT
Start: 2020-01-01 | End: 2020-01-01

## 2020-01-01 RX ORDER — ACETAMINOPHEN 650 MG/1
650 SUPPOSITORY RECTAL EVERY 4 HOURS PRN
Status: DISCONTINUED | OUTPATIENT
Start: 2020-01-01 | End: 2020-01-01

## 2020-01-01 RX ORDER — HALOPERIDOL 5 MG/ML
2 INJECTION INTRAMUSCULAR
Status: DISCONTINUED | OUTPATIENT
Start: 2020-01-01 | End: 2020-01-01

## 2020-01-01 RX ORDER — EPHEDRINE SULFATE 50 MG/ML
INJECTION, SOLUTION INTRAVENOUS AS NEEDED
Status: DISCONTINUED | OUTPATIENT
Start: 2020-01-01 | End: 2020-01-01 | Stop reason: SURG

## 2020-01-01 RX ORDER — SODIUM CHLORIDE 0.9 % (FLUSH) 0.9 %
10 SYRINGE (ML) INJECTION AS NEEDED
Status: DISCONTINUED | OUTPATIENT
Start: 2020-01-01 | End: 2020-01-01

## 2020-01-01 RX ORDER — IPRATROPIUM BROMIDE AND ALBUTEROL SULFATE 2.5; .5 MG/3ML; MG/3ML
3 SOLUTION RESPIRATORY (INHALATION) EVERY 6 HOURS PRN
Status: DISCONTINUED | OUTPATIENT
Start: 2020-01-01 | End: 2020-01-01

## 2020-01-01 RX ORDER — HYDROMORPHONE HYDROCHLORIDE 1 MG/ML
0.2 INJECTION, SOLUTION INTRAMUSCULAR; INTRAVENOUS; SUBCUTANEOUS EVERY 5 MIN PRN
Status: DISCONTINUED | OUTPATIENT
Start: 2020-01-01 | End: 2020-01-01

## 2020-01-01 RX ORDER — ACETAMINOPHEN 500 MG
1000 TABLET ORAL ONCE
Status: COMPLETED | OUTPATIENT
Start: 2020-01-01 | End: 2020-01-01

## 2020-01-01 RX ORDER — HEPARIN SODIUM 5000 [USP'U]/ML
5000 INJECTION, SOLUTION INTRAVENOUS; SUBCUTANEOUS EVERY 12 HOURS SCHEDULED
Status: DISCONTINUED | OUTPATIENT
Start: 2020-01-01 | End: 2020-01-01

## 2020-01-01 RX ORDER — IPRATROPIUM BROMIDE AND ALBUTEROL SULFATE 2.5; .5 MG/3ML; MG/3ML
3 SOLUTION RESPIRATORY (INHALATION) ONCE
Status: DISCONTINUED | OUTPATIENT
Start: 2020-01-01 | End: 2020-01-01

## 2020-01-01 RX ORDER — IPRATROPIUM BROMIDE AND ALBUTEROL SULFATE 2.5; .5 MG/3ML; MG/3ML
3 SOLUTION RESPIRATORY (INHALATION) ONCE
Status: COMPLETED | OUTPATIENT
Start: 2020-01-01 | End: 2020-01-01

## 2020-01-01 RX ORDER — MORPHINE SULFATE 4 MG/ML
4 INJECTION, SOLUTION INTRAMUSCULAR; INTRAVENOUS EVERY 10 MIN PRN
Status: DISCONTINUED | OUTPATIENT
Start: 2020-01-01 | End: 2020-01-01

## 2020-01-01 RX ORDER — MORPHINE SULFATE 2 MG/ML
INJECTION, SOLUTION INTRAMUSCULAR; INTRAVENOUS
Status: DISCONTINUED
Start: 2020-01-01 | End: 2020-01-01

## 2020-01-01 RX ORDER — LORAZEPAM 2 MG/ML
1 INJECTION INTRAMUSCULAR EVERY 4 HOURS PRN
Status: DISCONTINUED | OUTPATIENT
Start: 2020-01-01 | End: 2020-01-01

## 2020-01-01 RX ORDER — IPRATROPIUM BROMIDE AND ALBUTEROL SULFATE 2.5; .5 MG/3ML; MG/3ML
3 SOLUTION RESPIRATORY (INHALATION)
Status: DISCONTINUED | OUTPATIENT
Start: 2020-01-01 | End: 2020-01-01 | Stop reason: SDUPTHER

## 2020-01-01 RX ORDER — MORPHINE SULFATE 2 MG/ML
2 INJECTION, SOLUTION INTRAMUSCULAR; INTRAVENOUS EVERY 4 HOURS PRN
Status: DISCONTINUED | OUTPATIENT
Start: 2020-01-01 | End: 2020-01-01

## 2020-01-01 RX ORDER — LORAZEPAM 2 MG/ML
INJECTION INTRAMUSCULAR
Status: DISCONTINUED
Start: 2020-01-01 | End: 2020-01-01

## 2020-01-01 RX ORDER — PREGABALIN 100 MG/1
100 CAPSULE ORAL 2 TIMES DAILY
Qty: 60 CAPSULE | Refills: 3 | Status: ON HOLD | OUTPATIENT
Start: 2020-01-01 | End: 2020-01-01

## 2020-01-01 RX ORDER — CEFAZOLIN SODIUM/WATER 2 G/20 ML
2 SYRINGE (ML) INTRAVENOUS ONCE
Status: COMPLETED | OUTPATIENT
Start: 2020-01-01 | End: 2020-01-01

## 2020-01-01 RX ORDER — IPRATROPIUM BROMIDE AND ALBUTEROL SULFATE 2.5; .5 MG/3ML; MG/3ML
3 SOLUTION RESPIRATORY (INHALATION) EVERY 4 HOURS PRN
Status: DISCONTINUED | OUTPATIENT
Start: 2020-01-01 | End: 2020-01-01

## 2020-01-01 RX ORDER — HYDROCODONE BITARTRATE AND ACETAMINOPHEN 5; 325 MG/1; MG/1
2 TABLET ORAL AS NEEDED
Status: DISCONTINUED | OUTPATIENT
Start: 2020-01-01 | End: 2020-01-01

## 2020-01-01 RX ORDER — ACETAMINOPHEN 160 MG/5ML
650 SOLUTION ORAL EVERY 4 HOURS PRN
Status: DISCONTINUED | OUTPATIENT
Start: 2020-01-01 | End: 2020-01-01

## 2020-01-01 RX ORDER — MORPHINE SULFATE 2 MG/ML
1 INJECTION, SOLUTION INTRAMUSCULAR; INTRAVENOUS
Status: DISCONTINUED | OUTPATIENT
Start: 2020-01-01 | End: 2020-01-01

## 2020-01-01 RX ORDER — LORAZEPAM 2 MG/ML
0.5 INJECTION INTRAMUSCULAR
Status: DISCONTINUED | OUTPATIENT
Start: 2020-01-01 | End: 2020-01-01

## 2020-01-01 RX ORDER — LORAZEPAM 2 MG/ML
2 INJECTION INTRAMUSCULAR EVERY 4 HOURS PRN
Status: DISCONTINUED | OUTPATIENT
Start: 2020-01-01 | End: 2020-01-01

## 2020-01-01 RX ORDER — METHYLPREDNISOLONE SODIUM SUCCINATE 40 MG/ML
40 INJECTION, POWDER, LYOPHILIZED, FOR SOLUTION INTRAMUSCULAR; INTRAVENOUS EVERY 12 HOURS
Status: DISCONTINUED | OUTPATIENT
Start: 2020-01-01 | End: 2020-01-01

## 2020-01-01 RX ORDER — METHYLPREDNISOLONE SODIUM SUCCINATE 125 MG/2ML
60 INJECTION, POWDER, LYOPHILIZED, FOR SOLUTION INTRAMUSCULAR; INTRAVENOUS ONCE
Status: COMPLETED | OUTPATIENT
Start: 2020-01-01 | End: 2020-01-01

## 2020-01-01 RX ORDER — LORAZEPAM 2 MG/ML
0.5 INJECTION INTRAMUSCULAR EVERY 4 HOURS PRN
Status: DISCONTINUED | OUTPATIENT
Start: 2020-01-01 | End: 2020-01-01

## 2020-01-01 RX ORDER — METHYLPREDNISOLONE SODIUM SUCCINATE 40 MG/ML
40 INJECTION, POWDER, LYOPHILIZED, FOR SOLUTION INTRAMUSCULAR; INTRAVENOUS EVERY 6 HOURS
Status: DISCONTINUED | OUTPATIENT
Start: 2020-01-01 | End: 2020-01-01

## 2020-01-01 RX ORDER — SCOLOPAMINE TRANSDERMAL SYSTEM 1 MG/1
1 PATCH, EXTENDED RELEASE TRANSDERMAL
Status: DISCONTINUED | OUTPATIENT
Start: 2020-01-01 | End: 2020-01-01

## 2020-01-01 RX ORDER — BISACODYL 10 MG
10 SUPPOSITORY, RECTAL RECTAL
Status: DISCONTINUED | OUTPATIENT
Start: 2020-01-01 | End: 2020-01-01

## 2020-01-01 RX ORDER — BENZONATATE 100 MG/1
100 CAPSULE ORAL 3 TIMES DAILY PRN
Status: DISCONTINUED | OUTPATIENT
Start: 2020-01-01 | End: 2020-01-01

## 2020-01-01 RX ORDER — CODEINE PHOSPHATE AND GUAIFENESIN 10; 100 MG/5ML; MG/5ML
5 SOLUTION ORAL EVERY 4 HOURS PRN
Status: DISCONTINUED | OUTPATIENT
Start: 2020-01-01 | End: 2020-01-01

## 2020-01-01 RX ORDER — IPRATROPIUM BROMIDE AND ALBUTEROL SULFATE 2.5; .5 MG/3ML; MG/3ML
3 SOLUTION RESPIRATORY (INHALATION)
Status: DISCONTINUED | OUTPATIENT
Start: 2020-01-01 | End: 2020-01-01

## 2020-01-01 RX ORDER — ONDANSETRON 4 MG/1
4 TABLET, ORALLY DISINTEGRATING ORAL EVERY 6 HOURS PRN
Status: DISCONTINUED | OUTPATIENT
Start: 2020-01-01 | End: 2020-01-01

## 2020-01-01 RX ORDER — NALOXONE HYDROCHLORIDE 0.4 MG/ML
80 INJECTION, SOLUTION INTRAMUSCULAR; INTRAVENOUS; SUBCUTANEOUS AS NEEDED
Status: DISCONTINUED | OUTPATIENT
Start: 2020-01-01 | End: 2020-01-01

## 2020-01-01 RX ORDER — HYDROCODONE BITARTRATE AND ACETAMINOPHEN 5; 325 MG/1; MG/1
1 TABLET ORAL EVERY 6 HOURS PRN
Status: DISCONTINUED | OUTPATIENT
Start: 2020-01-01 | End: 2020-01-01

## 2020-01-01 RX ORDER — HALOPERIDOL 5 MG/ML
0.25 INJECTION INTRAMUSCULAR ONCE AS NEEDED
Status: DISCONTINUED | OUTPATIENT
Start: 2020-01-01 | End: 2020-01-01

## 2020-01-01 RX ORDER — FAMOTIDINE 20 MG/1
20 TABLET ORAL ONCE
Status: DISCONTINUED | OUTPATIENT
Start: 2020-01-01 | End: 2020-01-01 | Stop reason: HOSPADM

## 2020-01-01 RX ORDER — HEPARIN SODIUM (PORCINE) LOCK FLUSH IV SOLN 100 UNIT/ML 100 UNIT/ML
SOLUTION INTRAVENOUS
Status: DISPENSED
Start: 2020-01-01 | End: 2020-01-01

## 2020-01-01 RX ORDER — GLYCOPYRROLATE 0.2 MG/ML
0.4 INJECTION, SOLUTION INTRAMUSCULAR; INTRAVENOUS
Status: DISCONTINUED | OUTPATIENT
Start: 2020-01-01 | End: 2020-01-01

## 2020-01-01 RX ORDER — FUROSEMIDE 40 MG/1
40 TABLET ORAL EVERY 8 HOURS PRN
Status: DISCONTINUED | OUTPATIENT
Start: 2020-01-01 | End: 2020-01-01

## 2020-01-01 RX ORDER — METOCLOPRAMIDE HYDROCHLORIDE 5 MG/ML
5 INJECTION INTRAMUSCULAR; INTRAVENOUS EVERY 8 HOURS PRN
Status: DISCONTINUED | OUTPATIENT
Start: 2020-01-01 | End: 2020-01-01

## 2020-01-01 RX ORDER — LIDOCAINE HYDROCHLORIDE 10 MG/ML
INJECTION, SOLUTION EPIDURAL; INFILTRATION; INTRACAUDAL; PERINEURAL AS NEEDED
Status: DISCONTINUED | OUTPATIENT
Start: 2020-01-01 | End: 2020-01-01 | Stop reason: HOSPADM

## 2020-01-01 RX ORDER — HYDROMORPHONE HYDROCHLORIDE 1 MG/ML
0.6 INJECTION, SOLUTION INTRAMUSCULAR; INTRAVENOUS; SUBCUTANEOUS EVERY 5 MIN PRN
Status: DISCONTINUED | OUTPATIENT
Start: 2020-01-01 | End: 2020-01-01

## 2020-01-01 RX ADMIN — MIDAZOLAM HYDROCHLORIDE 1 MG: 1 INJECTION INTRAMUSCULAR; INTRAVENOUS at 09:31:00

## 2020-01-01 RX ADMIN — EPHEDRINE SULFATE 5 MG: 50 INJECTION, SOLUTION INTRAVENOUS at 10:18:00

## 2020-01-01 RX ADMIN — LIDOCAINE HYDROCHLORIDE 50 MG: 10 INJECTION, SOLUTION EPIDURAL; INFILTRATION; INTRACAUDAL; PERINEURAL at 09:31:00

## 2020-01-01 RX ADMIN — SODIUM CHLORIDE, SODIUM LACTATE, POTASSIUM CHLORIDE, CALCIUM CHLORIDE: 600; 310; 30; 20 INJECTION, SOLUTION INTRAVENOUS at 10:29:00

## 2020-01-01 RX ADMIN — CEFAZOLIN SODIUM/WATER 2 G: 2 G/20 ML SYRINGE (ML) INTRAVENOUS at 09:34:00

## 2020-01-01 RX ADMIN — EPHEDRINE SULFATE 5 MG: 50 INJECTION, SOLUTION INTRAVENOUS at 10:13:00

## 2020-01-02 PROBLEM — Z51.81 MEDICATION MONITORING ENCOUNTER: Status: ACTIVE | Noted: 2020-01-01

## 2020-01-02 PROBLEM — R79.89 ABNORMAL TSH: Status: ACTIVE | Noted: 2020-01-01

## 2020-01-02 PROBLEM — R22.2 MASS OF CHEST WALL, RIGHT: Status: ACTIVE | Noted: 2020-01-01

## 2020-01-02 NOTE — PROGRESS NOTES
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   Patient completed radiation therapy to the left C3-C4 and C5-C6 with moderate central spinal canal stenosis at C6-C7. Patient has been seen by neurology, neurosurgery, Ortho, physiatry and is continuing her current pregabalin without any improvement in her pain.   She had left C7-T1 anterior lamina are Medication Sig Dispense Refill   • dilTIAZem HCl ER (DILT-XR) 120 MG Oral Capsule SR 24 Hr TAKE 1 CAPSULE(120 MG) BY MOUTH DAILY 90 capsule 0   • escitalopram 20 MG Oral Tab Take 1 tablet (20 mg total) by mouth daily.  90 tablet 1   • pregabalin (LYRICA) Past Surgical History:   Procedure Laterality Date   • CARPAL TUNNEL RELEASE Left 5/25/2018    Performed by Concepcion Bain MD at [facility]   • CATARACT     • COLONOSCOPY  10/3/13= Hemorrhoids    hemorrhoids; repeat PRN; Dr Lizeth aLo   • ES Relationship status: Not on file      Intimate partner violence:        Fear of current or ex partner: Not on file        Emotionally abused: Not on file        Physically abused: Not on file        Forced sexual activity: Not on file    Other Topics Cardiovascular: Normal rate and regular rhythm. No murmur heard. Pulmonary/Chest: Effort normal. She has no wheezes. She has no rales. She exhibits no tenderness. no palpable masses  Abdominal: Soft.  Bowel sounds are normal. She exhibits no distensio secondary infection superficially - subcm area on lateral portion. Warm compress. Triple antibiotic cream daily until resolved.   If pain or erythema increases, call office for oral antibiotic    Follow-up in 3 weeks prior to cycle 4  Results    9/17/2019 neoplasms.  ===================================  Component      Latest Ref Rng & Units 12/12/2019 11/25/2019 11/22/2019 11/19/2019   WBC      4.0 - 11.0 x10(3) uL 4.3 5.8 5.1 4.5   RBC      3.80 - 5.30 x10(6)uL 4.19 4.27 3.61 (L) 2.19 (L)   Hemoglobin 289      eGFR NON-AFR.  AMERICAN      >=60 83      eGFR       >=60 95      ALT (SGPT)      13 - 56 U/L 13      AST (SGOT)      15 - 37 U/L 18      ALKALINE PHOSPHATASE      55 - 142 U/L 109      Total Bilirubin      0.1 - 2.0 mg/dL 0.5

## 2020-01-02 NOTE — PATIENT INSTRUCTIONS
1. Proceed with cycle 3    2. Infed tomorrow    3. Triple antibiotic cream or neosporin cream once daily with bandaid for 1 week. Follow-up if increased pain or redness. Would consider an oral antibiotic. Warm compresses    4. Call as needed    5.  Bobbi Smiley

## 2020-01-03 NOTE — PROGRESS NOTES
Pt here for C3D1 Keytruda and iron dextran. Arrives Ambulating independently, accompanied by Family member, daughter         Modifications in dose or schedule: No    Patient reports she is well, denies any complaints.       PIV placed to rt lower forearm, 1

## 2020-01-23 PROBLEM — M79.642 PAIN IN LEFT HAND: Status: ACTIVE | Noted: 2020-01-01

## 2020-01-23 NOTE — PROGRESS NOTES
CT chest order placed in [facility]. Pt aware to have the CT done prior to seeing Dr Tim Aguirre, which is scheduled for 2/6/20.

## 2020-01-23 NOTE — PROGRESS NOTES
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   Patient completed radiation therapy to the left l improvement in her pain. She had left C7-T1 anterior lamina epidural steroid injection 7/5/2019.   She was not thought to be a good candidate for surgery and declined surgery in the past.  Patient is willing to see Dr. Zac Arriaga in follow-up, hoping he may be blood in stool. No black stool, no blood in stool   Genitourinary: Negative for dysuria and hematuria. Musculoskeletal: Positive for gait problem, neck pain and neck stiffness.         See HPI   Status post left carpal tunnel surgery  Patient now i Antibiotics       HIVES    Past Medical History:   Diagnosis Date   • Adenocarcinoma of left lung, stage 1 ([facility] 8/31/2016   • Anemia    • Anxiety    • Chemotherapy management, encounter for 11/1/2019   • COPD (chronic obstructive pulmonary disease) ([facility] Inability: Not on file      Transportation needs:        Medical: Not on file        Non-medical: Not on file    Tobacco Use      Smoking status: Current Every Day Smoker        Packs/day: 1.00        Years: 56.00        Pack years: 64        Types: Ciga CAD   • Stroke Mother    • Diabetes Neg    • Lipids Neg          PHYSICAL EXAM:     /67 (BP Location: Left arm, Patient Position: Sitting)   Pulse 77   Temp 98.1 °F (36.7 °C) (Oral)   Resp 16   Wt 39 kg (86 lb)   SpO2 90%   BMI 15.73 kg/m²     Physic to her pain. She appropriately declined consideration of chemotherapy. She was agreeable to a short course of radiation therapy to the lung disease per Dr. Taveras Done, had a Port-A-Cath placed, and is tolerating  systemic therapy with pembrolizumab.   Patient has Absolute      0.00 - 0.70 x10(3) uL 0.02 0.01 0.02   Basophils Absolute      0.00 - 0.20 x10(3) uL 0.05 0.06 0.05   Immature Granulocyte Absolute      0.00 - 1.00 x10(3) uL 0.03 0.02 0.02   Neutrophils %      % 79.7 71.5 75.7   Lymphocytes %      % 12.1 15 carcinoma  ===================================  9/6/2019 PET/CT  CONCLUSION:   1. Abnormal F 18 FDG PET-CT study demonstrating a hypermetabolic left upper lobe nodule highly suspicious for malignancy  2.  There are additional hypermetabolic left upper lobe

## 2020-01-23 NOTE — PROGRESS NOTES
Pt here for C4D1 Keytruda. Pt prefers Thursdays last Keytruda Friday 1/3 d/t holiday. 1 day early today 1/23 OK to treat. Arrives Ambulating independently, accompanied by daughter, Mayank Montero.          Modifications in dose or schedule: No    Patient reports sh

## 2020-01-30 NOTE — TELEPHONE ENCOUNTER
[place] called in and was looking to speak with Genesis Ziegler about [employer] upcoming appointment on 2/6. She asked that she get a call back to go over this.

## 2020-02-05 NOTE — DISCHARGE SUMMARY
Outpatient Surgery Brief Discharge Summary         Patient ID:  Josey Host  [id number]  82year old  12/26/1935    Discharge Diagnoses: chest wall mass    Procedures: excision of right chest wall mass    Discharged Condition: stable    Disposition: ho shower head. The tape bandage may be removed in 3 days and leave the [employer] \"butterfly\" tapes intact. You may continue to shower after that.   If the gauze under your dressing gets wet prior to removing it, remove gauze and tape and continue to showe abdomen, chest or neck. The achy feeling should go away in the next 24 hours  · To feel weak, sleepy or \"wiped out\". Your should start feeling better in the next 24 hours.    · To experience mild discomforts such as sore lip or tongue, headache, cramps, g Cp24  Commonly known as:  Dilt-XR      TAKE 1 CAPSULE(120 MG) BY MOUTH DAILY   Quantity:  90 capsule  Refills:  0     escitalopram 20 MG Tabs  Commonly known as:  LEXAPRO      Take 1 tablet (20 mg total) by mouth daily.    Quantity:  90 tablet  Refills:  1

## 2020-02-05 NOTE — H&P
Berta Nascimento is a 82year old female. HPI:       The patient presents for Evaluation of right chest wall mass. It is located at right chest wall at edge of pectoralis major muscle. It has been present for 2 months. Symptoms include sore and tender. • Chemotherapy management, encounter for 11/1/2019   • COPD (chronic obstructive pulmonary disease) (HCC)       O2 PRN - Dr Quin Gordon   • Coronary artery disease ptcs 10 YEARS AGO     Dr Urmila Patel   • Depression     • Exposure to medical diagnostic radiation   Smokeless tobacco: Never Used      Tobacco comment: since age 23    Alcohol use: No      Alcohol/week: 0.0 standard drinks    Drug use: No         ROS:   General/Constitutional: Denies Fever. Allergy/Immunology: Denies Rash.    Respiratory: Denies Janice

## 2020-02-05 NOTE — ANESTHESIA POSTPROCEDURE EVALUATION
Patient: Cliff Casey    Procedure Summary     Date:  02/05/20 Room / Location:  [address] [place] [address] MAIN OR 08 / [address] [place] [address] MAIN OR    Anesthesia Start:  0930 Anesthesia Stop:      Procedure:  EXCISION LESION TORSO/BACK (Right Chest) Diagnosis:  (chest wall mass)    Surgeon:

## 2020-02-05 NOTE — OPERATIVE REPORT
[facility] OPERATIVE REPORT:     PATIENT NAME: Ramirez Ruano  : 1935   MRN/CSN: [id number]    DATE OF OPERATION:   20    PREOPERATIVE DIAGNOSIS:  Right chest wall Soft Tissue Mass suspicious for metastatic lung cancer     POSTOPERATIVE DIAGNOSIS:

## 2020-02-05 NOTE — BRIEF OP NOTE
[facility] POST ANESTHESIA CARE UNIT  Brief Op Note       Patients Name: Ruben Castaneda  Attending Physician: Lisette Dakins, MD  Operating Physician: Lisette Dakins, MD  CSN: [id number]     Location:  OR  MRN: [id number]    Date of Birth: [birth year]

## 2020-02-05 NOTE — ANESTHESIA PREPROCEDURE EVALUATION
Anesthesia PreOp Note    HPI:     Jameson Bell is a 82year old female who presents for preoperative consultation requested by: Christina Estrella MD    Date of Surgery: 2/5/2020    Procedure(s):  EXCISION LESION TORSO/BACK  Indication: chest wall mass 12/12/2018      Acute pain of right shoulder         Date Noted: 06/27/2018      Left carpal tunnel syndrome: mild sensory and motor         Date Noted: 05/17/2018      Primary osteoarthritis of right shoulder         Date Noted: 05/17/2018      Disorder o medical diagnostic radiation    • High blood pressure    • History of blood transfusion 11/2019    [facility]   • Iron deficiency anemia due to chronic blood loss 9/16/2016   • Lesion of right lung 11/1/2019   • Lung cancer ([facility] [address] Samuel 2013    L lung nodule - (PROBIOTIC DAILY OR), Take by mouth daily. , Disp: , Rfl: , 1/30/2020  albuterol sulfate (2.5 MG/3ML) 0.083% Inhalation Nebu Soln, Take 3 mL (2.5 mg total) by nebulization every 12 (twelve) hours.  (Patient taking differently: Take 2.5 mg by nebulization 4 Smokeless tobacco: Never Used      Tobacco comment: since age 23    Substance and Sexual Activity      Alcohol use: No        Alcohol/week: 0.0 standard drinks      Drug use: No      Sexual activity: Not on file    Lifestyle      Physical activity: 01/23/2020    CA 9.5 01/23/2020          Vital Signs: Body mass index is 15.18 kg/m². height is 1.575 m (5' 2\") and weight is 37.6 kg (83 lb). Her oral temperature is 97.4 °F (36.3 °C). Her blood pressure is 133/62 and her pulse is 81.  Her respiration answered to the best of my ability. The patient desires the anesthetic management as planned.   DELFINO STAPLES  2/5/2020 8:44 AM

## 2020-02-13 NOTE — PROGRESS NOTES
Per Clinic RN, no treatment today. To infusion to remove PIV   2 x 2 and coban to site  discharged ambulatory back to clinic with daughter    No future infusion  appointments made.  Family is meeting with Palliative care

## 2020-02-13 NOTE — CONSULTS
Palliative Care Consult Note     Patient Name: Yelena Mccrary   YOB: 1935   Medical Record Number: [id number]   Date of visit: 2/13/2020     Chief Complaint/Reason for Visit:  Patient presents with:  Palliative Care    Reason for Consultat tunnel syndrome: mild sensory and motor     Primary osteoarthritis of right shoulder     Disorder of rotator cuff syndrome of right shoulder and allied disorder     Acute pain of right shoulder     Right rotator cuff tendonitis     Numbness     Cervical sp • CARPAL TUNNEL RELEASE Left 5/25/2018    Performed by Jayme Gayle MD at [facility]   • CATARACT     • COLONOSCOPY  10/3/13= Hemorrhoids    hemorrhoids; repeat PRN; Dr Mau Issa   • ESOPHAGOGASTRODUODENOSCOPY (EGD) N/A 11/22/2019    Perfor smoker (~1 ppd)  Denies ETOH  Denies medical marijuana use/illicit drug use    Functional status:  Edwar Alatorre is independent with ADLs  She uses walker PRN for balance and ambulation assistance  Edwar Alatorre no longer drives    Goals of care counseling/advance care dilTIAZem HCl ER (DILT-XR) 120 MG Oral Capsule SR 24 Hr TAKE 1 CAPSULE(120 MG) BY MOUTH DAILY 90 capsule 0   • escitalopram 20 MG Oral Tab Take 1 tablet (20 mg total) by mouth daily.  90 tablet 1   • Calcium Carbonate-Vit D-Min (CALCIUM 1200 OR) Take by dori Psychiatric/Behavioral: Positive for depression (on Lexapro). Negative for hallucinations, memory loss, substance abuse and suicidal ideas. The patient is not nervous/anxious and does not have insomnia.         Physical Examination:  Physical Exam  Vitals : Yes        Healthcare Agent Appointed: Yes  Healthcare Agent's Name: Arsh Arreguin (víctor)  Healthcare Agent's Phone Number: [phone]          Palliative Care Assessment/Plan:  1. Neuropathy  - pregabalin 100 MG Oral Cap;  Take 1 capsule (100 mg Palliative Care Team to participate in the care of your patient. I will continue to follow clinically.     JOSUE Mullen [facility]  [phone]  2/13/2020

## 2020-02-14 NOTE — PROGRESS NOTES
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   Geoff Krishnan long-standing complaints of left hand burning pain and numbness now extending to the left elbow. She has known severe central spinal canal stenosis at C3-C4 and C5-C6 with moderate central spinal canal stenosis at C6-C7.   Patient has been seen by neurolog with taking the upper denture out and using salt water rinses   Eyes:        New glasses   Respiratory: Positive for cough and shortness of breath. Cardiovascular: Negative for chest pain and leg swelling.    Gastrointestinal: Negative for nausea, vomiti 2000 units Oral Cap Take 1 capsule by mouth daily. • B Complex Vitamins (VITAMIN B COMPLEX OR) Take 1 tablet by mouth daily. • Multiple Vitamins-Minerals (MULTIVITAMIN OR) Take 1 tablet by mouth daily.          Allergies:     Sulfa Antibiotics normal     Social History    Socioeconomic History      Marital status:        Spouse name: Not on file      Number of children: Not on file      Years of education: Not on file      Highest education level: Not on file    Occupational History      N Asked        Self-Exams: Not Asked    Social History Narrative      The patient does not use an assistive device. .        The patient does live in a home with stairs.     Family History   Problem Relation Age of Onset   • Cancer Father         lung   • Hype aware that NCCN guidelines initial treatment for limited small cell lung cancer includes systemic therapy +/-radiation therapy. Patient is well aware of her compromised nutritional and functional status related to her pain.   She appropriately declined con 131.0 (L) 154.0 140.0 (L)   Prelim Neutrophil Abs      1.50 - 7.70 x10 (3) uL 3.16 4.49 3.40   Neutrophils Absolute      1.50 - 7.70 x10(3) uL 3.16 4.49 3.40   Lymphocytes Absolute      1.00 - 4.00 x10(3) uL 0.55 (L) 0.68 (L) 0.72 (L)   Monocytes Absolute comment)  · ===================================================  1/25/2020 CT chest  Extensive pleural thickening with fibrosis within the bilateral upper lobes consistent with treated.   The right upper lobe spiculated nodule continues to decrease in size PET-CT study demonstrating a hypermetabolic left upper lobe nodule highly suspicious for malignancy  2. There are additional hypermetabolic left upper lobe nodules posterior to the left hilum and along the pleura laterally.   These are also suspicious for m

## 2020-02-19 NOTE — ED PROVIDER NOTES
Patient Seen in: [facility] Emergency Department      History   Patient presents with:  Trauma  Dyspnea ELZBIETA SOB    Stated Complaint: fall    HPI    21-year-old female with past medical history significant for lung CA, anemia, anxiety, COPD on home • COLONOSCOPY  10/3/13= Hemorrhoids    hemorrhoids; repeat PRN; Dr Hayde Calle   • ESOPHAGOGASTRODUODENOSCOPY (EGD) N/A 11/22/2019    Performed by Aleta Devine MD at [facility] ENDOSCOPY   • ESOPHAGOGASTRODUODENOSCOPY, COLONOSCOPY, POSSIBLE BIOPSY, POSSIBLE POLYP ecchymosis  Abdominal: Nontender. Nondistended. Soft. Bowel sounds are normal.   Back:   : Musculoskeletal: Normal range of motion. No deformity. Lymphadenopathy: No sig cervical LAD   Neurological: Awake, alert. Normal reflexes.  No cranial nerve de 8th rib fracture. 2. Emphysema. Left greater than right apical nodularity, better assessed on recent prior chest CT.    Dictated by (CST): Austin Odonnell MD on 2/18/2020 at 19:58     Approved by (CST): Austin Odonnell MD on 2/18/2020 at 20:03

## 2020-02-19 NOTE — ED INITIAL ASSESSMENT (HPI)
Pt BIBEMS s/p fall this morning at home, pt states \"I was getting up from bed and I got woozy,\" pt fell backwards and hit upper R thoracic area and hit L side of head, pt denies blood thinner, denies LOC.  Pt on 3L O2 at home, with hx of lung CA, pt sats

## 2020-02-20 PROBLEM — J96.21 ACUTE ON CHRONIC RESPIRATORY FAILURE WITH HYPOXIA (HCC): Status: ACTIVE | Noted: 2020-01-01

## 2020-02-20 PROBLEM — S22.31XA CLOSED FRACTURE OF ONE RIB OF RIGHT SIDE, INITIAL ENCOUNTER: Status: ACTIVE | Noted: 2020-01-01

## 2020-02-20 PROBLEM — C34.90 LUNG CANCER (HCC): Status: ACTIVE | Noted: 2020-01-01

## 2020-02-20 NOTE — CONSULTS
[facility]    Report of Consultation    King Dean Patient Status:  Inpatient    1935 MRN [id number]   Location [facility] 3W/SW Attending Nikolas Desai MD   Hosp Day # 0 PCP Talya Burks MD     Date of Admission: History     Past Medical History:   Diagnosis Date   • Adenocarcinoma of left lung, stage 1 ([facility] [address] 8/31/2016   • Anemia    • Anxiety    • Chemotherapy management, encounter for 11/1/2019   • COPD (chronic obstructive pulmonary disease) (HCC)     O2 PRN • Lipids Neg      Social History:  Social History    Tobacco Use      Smoking status: Current Every Day Smoker        Packs/day: 1.00        Years: 56.00        Pack years: 64        Types: Cigarettes      Smokeless tobacco: Never Used      Tobacco comme Psychiatric/Behavioral: The patient is nervous/anxious. Patient has been concerned with the welfare of her family prior to this admission       Physical Exam:   Vital Signs:   height is 1.575 m (5' 2\") and weight is 39 kg (85 lb 15.7 oz).  Her te findings as above. Dictated by (CST): Quin Talbot MD on 2/18/2020 at 19:32     Approved by (CST): Quin Talbot MD on 2/18/2020 at 19:35          Xr Chest Ap Portable  (cpt=71045)    Result Date: 2/20/2020  CONCLUSION:  1. Overall little change.  2. with hypoxia [facility])  Patient has completed a POLST form. Patient is appropriate for hospice care. Closed fracture of one rib of right side, initial encounter  Patient has uncontrolled pain secondary to the traumatic rib fracture.   Patient will require

## 2020-02-20 NOTE — RESPIRATORY THERAPY NOTE
Patient placed on Bipap at [facility] 99- S/T 12/5, rate-12, 80%  Nebulizer given in line through bipap  Changes made- 02 adjusted to 60% at 0500  per.  ED doctor    Current readings     02/20/20 0410   BiPAP/CPAP Monitored Parameters   PIP 13   Total Rate

## 2020-02-20 NOTE — H&P
[facility] Hospitalist H&P       CC: Patient presents with:  Dyspnea ELZBIETA SOB       PCP: Treva Gee MD    History of Present Illness: 83 y/o f w COPD, cervical spinal stenosis/PN, chronic hypoxemic resp failure-uses 2 L of oxygen at night, tobacco abuse, • ESOPHAGOGASTRODUODENOSCOPY, COLONOSCOPY, POSSIBLE BIOPSY, POSSIBLE POLYPECTOMY 59998, 02020 N/A 10/3/2013    Performed by Martina Diaz MD at [facility] 2/5/2020    Performed by Ramon Palacios MD wall:  No tenderness or deformity. Heart:  Regular rate and rhythm, S1, S2 normal, no murmur, rub or gallop appreciated   Abdomen:   Soft, non-tender. Bowel sounds normal. No masses,  No organomegaly.  Non distended   Extremities: Extremities normal, atra Satya Jorgensen MD on 2/20/2020 at 7:25          Xr Ribs With Chest (3 Views), Right  (cpt=71101)    Result Date: 2/18/2020  CONCLUSION:  1. Acute nondisplaced right lateral 8th rib fracture. 2. Emphysema.   Left greater than right apical nodularity, better assessed

## 2020-02-20 NOTE — ED INITIAL ASSESSMENT (HPI)
Patient here last night for fall and diagnosed with rib fx. Here tonight for shallow breathing and sob. Patient on home O2 2L. Was 86% on home o2 upon EMS arrival with wheezing.  2 albuterol neb per EMS pta

## 2020-02-20 NOTE — ED PROVIDER NOTES
Patient Seen in: [facility] AND [facility] Emergency Department      History   Patient presents with:  Dyspnea ELBZIETA SOB    Stated Complaint: dyspnea    HPI    81-year-old female with chronic lung disease here now 2 days ago after fall with a reported acute right POLYPECTOMY 89887, 59600 N/A 10/3/2013    Performed by Olman Lloyd MD at [facility] 2/5/2020    Performed by Lisette Dakins, MD at [address] [place] [address] MAIN OR   • OTHER SURGICAL HISTORY  2013, [place]    lung CA - of motion. No edema or tenderness. Neurological: Alert and oriented to person, place, and time. No obvious focal deficit  Skin: Skin is warm and dry. Psychiatric: Normal mood and affect. Behavior is normal.   Nursing note and vitals reviewed.     Diff airspace opacities. The results were faxed/finalized only at 6477 ET. If you would like to discuss this case directly please call [phone] (extension [phone]). If you can't reach me at this number, do not leave a voicemail.   Please call [phone] ex

## 2020-02-20 NOTE — CM/SW NOTE
Hospice MSW received call from [facility] 26 to advise of hospice consult; [address] sent.

## 2020-02-20 NOTE — CONSULTS
[facility]    PATIENT'S NAME: JIMMYRAFAELA PORFIRIO MIKI   ATTENDING PHYSICIAN: Watson Portillo DO   CONSULTING PHYSICIAN: Jean Pierre Dickinson.  Santos Fontaine MD   PATIENT ACCOUNT#:   [id number]    LOCATION:  [address] #:   [id number]       DATE OF BIRTH:  [birth year] intact. Nose:  No congestion or polyp. Mouth and throat:  Free. NECK:  No neck stiffness. No palpable nodes. No carotid bruits. CHEST:  Symmetrical.  LUNGS:  Rhonchi, wheezing. HEART:  Regular. No murmur.   Cardiac dullness was normal.  ABDOMEN:  So

## 2020-02-20 NOTE — CM/SW NOTE
Received MDO for hospice consult. SW contacted [facility] and spoke w/ Eder Franquez - initiated referral for hospice consult. SW/CM to remain available for support and/or discharge planning.      Estil Part, [address] [facility]

## 2020-02-20 NOTE — PROGRESS NOTES
[facility] Pharmacy Note:  Renal Dose Adjustment for Metoclopramide (REGLAN)    Jameson Bell has been prescribed Metoclopramide (REGLAN) 10 mg every 8 hours as needed for n/v.    Estimated Creatinine Clearance: 37.1 mL/min (based on SCr of 0.72 mg/dL).     Her

## 2020-02-21 NOTE — PLAN OF CARE
Patient received to this writer at change of shift. Family at bedside, awaiting transfer to [facility]. Patient alert, patient in pain when coughing due to rib fx. Morphine administered and scopolamine patch placed behind right ear.  Tele removed, IVF's discontinu

## 2020-02-21 NOTE — PLAN OF CARE
Problem: Patient Centered Care  Goal: Patient preferences are identified and integrated in the patient's plan of care  Description  Interventions:  - What would you like us to know as we care for you?  I feel fine  - Provide timely, complete, and accurate frequently for physical needs  - Identify cognitive and physical deficits and behaviors that affect risk of falls.   - [place] fall precautions as indicated by assessment.  - Educate pt/family on patient safety including physical limitations  - Instruct p

## 2020-02-21 NOTE — CM/SW NOTE
Rounded this afternoon to see pt; pt sleeping, son at bedside. Assessed for needed support, son states doing well and is expecting family later tonight. MSW will continue to support pt, family and hospital staff during pts hospice admission.

## 2020-02-21 NOTE — HOSPICE RN NOTE
GIP DAY 2  Patient very pleasant and alert  Up to the bathroom with help with walker  Oxygen 4 liters nasal cannula  Patient denies any pain or dyspnea at this visit  Family was thinking about possibly taking her home in few days  They asked about oral ant

## 2020-02-21 NOTE — H&P
[facility] Hospitalist H&P       CC: No chief complaint on file.        PCP: Pinky Elena MD    History of Present Illness: 81 y/o f w COPD, cervical spinal stenosis/PN, chronic hypoxemic resp failure-uses 2 L of oxygen at night, tobacco abuse, hypertension ESOPHAGOGASTRODUODENOSCOPY, COLONOSCOPY, POSSIBLE BIOPSY, POSSIBLE POLYPECTOMY 26237, 60127 N/A 10/3/2013    Performed by Caleb Morrell MD at [facility] 2/5/2020    Performed by Ruperto Clifford MD at Regular rate and rhythm, S1, S2 normal, no murmur, rub or gallop appreciated   Abdomen:   Soft, non-tender. Bowel sounds normal. No masses,  No organomegaly. Non distended   Extremities: Extremities normal, atraumatic, no cyanosis or edema.    Skin: Skin co Ribs With Chest (3 Views), Right  (cpt=71101)    Result Date: 2/18/2020  CONCLUSION:  1. Acute nondisplaced right lateral 8th rib fracture. 2. Emphysema. Left greater than right apical nodularity, better assessed on recent prior chest CT.    Dictated by (C

## 2020-02-21 NOTE — PROGRESS NOTES
S: Patient is recent admit to hospice. Son and DIL at bedside. O: Patient was alert, conversational, and hopeful she will be able to return home.   A:  provided emotional support and spiritual care to patient and family through active listening, li

## 2020-02-21 NOTE — CM/SW NOTE
02/21/20 0900   CM/SW Referral Data   Referral Source    Reason for Referral Readmission   Informant   (chart review)         CM self referred for readmission.  Upon chart review, pt current health status is general inpatient hospice appropri

## 2020-02-21 NOTE — CM/SW NOTE
Hospice Team met with pt, pts dtr and son who were at bedside. Pt sitting upright in bed is a/o 2-3 and able to express her wishes. Family questioning pts gip vs home with hospice at this point.   Both TNL and MSW provided explanation and answered all que

## 2020-02-21 NOTE — HOSPICE RN NOTE
Came by to assess the patient Patient was sleeping at my visit. POC was discussed with son at bedside.

## 2020-02-21 NOTE — HOSPICE RN NOTE
Hospice RN met with pt family (son & dtr, both [facility]), hospice philosophy and benefit discussed, family had experience with hospice when their father was on hospice.  Family ready to d/c option for bipap as well as current medications and transition pt to h

## 2020-02-21 NOTE — PLAN OF CARE
Pt A&Ox4, but can be forgetful. Upx2 walker and GB. Regular diet tolerating well. 4-5L O2. Family met with [facility] from hospice, deciding on transition to home hospice. Started on Levaquin Q48 hrs. Pt started on ear drops for left ear congestion.   Pt voidin pain and pain management  - Manage/alleviate anxiety  - Utilize distraction and/or relaxation techniques  - Monitor for opioid side effects  - Notify MD/LIP if interventions unsuccessful or patient reports new pain  - Anticipate increased pain with activit

## 2020-02-21 NOTE — PROGRESS NOTES
[facility] Pharmacy Note:  Renal Adjustment for levofloxacin (Ana [place])    Julián Campos is a 82year old female who has been prescribed levofloxacin (LEVAQUIN) 750 mg every 24 hrs.   CrCl is estimated creatinine clearance is 37.1 mL/min (based on SCr of 0.72 mg

## 2020-02-22 NOTE — HOSPICE RN NOTE
GIP day 3. PT alert and laying in bed. PT with continued complaints of pain on the right side where fx is. Morphine drip increased to 2mg/hr this AM. Pt states that pain is still present but tolerable at this point. Congested as well.  No dyspnea noted but

## 2020-02-22 NOTE — PLAN OF CARE
Patient A&Ox4, occasionally forgetful. General diet, pleasure feeds. Morphine drip in place at 1mg/hr. 4L O2 in place. Bedalarm in place. Call light in reach. Frequent rounding performed.      Problem: Patient Centered Care  Goal: Patient preferences are id unsuccessful or patient reports new pain  - Anticipate increased pain with activity and pre-medicate as appropriate  Outcome: Progressing     Problem: SAFETY ADULT - FALL  Goal: Free from fall injury  Description  INTERVENTIONS:  - Assess pt frequently for

## 2020-02-22 NOTE — PLAN OF CARE
Problem: Patient Centered Care  Goal: Patient preferences are identified and integrated in the patient's plan of care  Description  Interventions:  - What would you like us to know as we care for you?  My family would prefer if you avoid using the word ho injury  Description  INTERVENTIONS:  - Assess pt frequently for physical needs  - Identify cognitive and physical deficits and behaviors that affect risk of falls.   - [place] fall precautions as indicated by assessment.  - Educate pt/family on patient sa

## 2020-02-22 NOTE — PROGRESS NOTES
[facility] Hospitalist Progress Note     CC: Hospital Follow up    PCP: Junior Amarjit MD       Assessment/Plan:     Active Problems:    Lung cancer [facility])    GOC:   Long discussion with daughter, on 4-5L nc, morphine drip at 2  -oral levaquin and nebs prn at d 105   CO2 32.0 32.0       No results for input(s): ALT, AST, ALB, AMYLASE, LIPASE, LDH in the last 168 hours. Invalid input(s): ALPHOS, TBIL, DBIL, TPROT      Imaging:  Xr Chest Ap Portable  (cpt=71045)    Result Date: 2/20/2020  CONCLUSION:  1.  Overall

## 2020-02-23 NOTE — HOSPICE RN NOTE
GIP day 4. PT seen sitting in the chair. Morphine drip continued at 2mg/hr for pain control. Oxygen in place. Pt states that symptoms are currently managed with regimen.  Will have a discussion with son this afternoon about potentially sending the patient h

## 2020-02-23 NOTE — PLAN OF CARE
Patient A&Ox4, occasionally forgetful. General diet, pleasure feeds. Morphine drip in place at 2mg/hr. [place] given PRN for congestions. 5L O2 in place. Bedalarm in place. Call light in reach. Frequent rounding performed.      Problem: Patient Centered Car Notify MD/LIP if interventions unsuccessful or patient reports new pain  - Anticipate increased pain with activity and pre-medicate as appropriate  Outcome: Progressing     Problem: SAFETY ADULT - FALL  Goal: Free from fall injury  Description  INTERVENTIO

## 2020-02-23 NOTE — PROGRESS NOTES
[facility] Hospitalist Progress Note     CC: Hospital Follow up    PCP: Viktoriya Xie MD       Assessment/Plan:     Active Problems:    Lung cancer [facility])    GOC:   Long discussion with son, on 4-5L nc, morphine drip at 2, having some hand pain and back pain w CO2 32.0 32.0       No results for input(s): ALT, AST, ALB, AMYLASE, LIPASE, LDH in the last 168 hours.     Invalid input(s): ALPHOS, TBIL, DBIL, TPROT      Imaging:          Meds:     • levofloxacin  750 mg Oral Q48H   • carbamide peroxide  5 drop Both E

## 2020-02-23 NOTE — PLAN OF CARE
Problem: Patient Centered Care  Goal: Patient preferences are identified and integrated in the patient's plan of care  Description  Interventions:  - What would you like us to know as we care for you?  My family would prefer if you avoid using the word ho injury  Description  INTERVENTIONS:  - Assess pt frequently for physical needs  - Identify cognitive and physical deficits and behaviors that affect risk of falls.   - [place] fall precautions as indicated by assessment.  - Educate pt/family on patient sa

## 2020-02-24 NOTE — PROGRESS NOTES
[facility] Hospitalist Progress Note     Reason for Admission:   PCP: Isidro Wall MD     Assessment/Plan:     Active Problems:    Lung cancer [facility])    Ms. Funmilayo Faulkner is a 83 yo female with PMH of COPD, chronic respiratory failure,recent diagnosis of small cell CA 9.1 9.4    141   K 4.6 4.5    105   CO2 32.0 32.0       No results for input(s): ALT, AST, ALB, AMYLASE, LIPASE, LDH in the last 168 hours.     Invalid input(s): ALPHOS, TBIL, DBIL, TPROT    Imaging:          Meds:     • levofloxacin  750 m

## 2020-02-24 NOTE — PLAN OF CARE
Patient A&Ox4, occasionally forgetful. General diet, pleasure feeds. Morphine drip in place at 2mg/hr. PRN IV push morphine and heat packs for breakthrough pain. 4L O2 in place. Bedalarm in place. Call light in reach. Frequent rounding performed.      Probl opioid side effects  - Notify MD/LIP if interventions unsuccessful or patient reports new pain  - Anticipate increased pain with activity and pre-medicate as appropriate  Outcome: Progressing     Problem: SAFETY ADULT - FALL  Goal: Free from fall injury  D

## 2020-02-24 NOTE — HOSPICE RN NOTE
GIP DAY 5  Patient has José Leader in place at this time  Not responsive during my afternoon visit  She kept her eyes closed   No family at bedside  POC was discussed with Keena HERNANDEZ

## 2020-02-24 NOTE — PLAN OF CARE
Problem: Patient Centered Care  Goal: Patient preferences are identified and integrated in the patient's plan of care  Description  Interventions:  - What would you like us to know as we care for you?  My family would prefer if you avoid using the word ho injury  Description  INTERVENTIONS:  - Assess pt frequently for physical needs  - Identify cognitive and physical deficits and behaviors that affect risk of falls.   - [place] fall precautions as indicated by assessment.  - Educate pt/family on patient sa

## 2020-02-24 NOTE — HOSPICE RN NOTE
GIP DAY 5  Patient barely awake during my visit today  She seems to be staring up at ceiling and not focusing with her eyes  Patient on Morphine drip at 3mg/hour to manage her dyspnea and right sided chest pain from Fractured Ribs  Denied any pain at this

## 2020-02-24 NOTE — PROGRESS NOTES
visited with pt/family at bedside. Pt.  Son Emma Manning asked  to [facility] patients memorial service when she passes, at patients request.  Adriana Warren provided emotional support to family and spiritual care to patient through prayer and validation

## 2020-02-24 NOTE — CM/SW NOTE
Hospice SW rounded to see pt, multiple family members at bedside including son and dtr. Pt a/o x1,has some garbled speech. Dtr is tearful and stating that \"Mom is being called home\".  MSW offered supportive presence, active listening and emotional suppo

## 2020-02-25 NOTE — PLAN OF CARE
Patient drowsy but comfortable through night on morphine drip at 3mg/hour. Son at bedside through night coping appropriate.   Problem: Patient Centered Care  Goal: Patient preferences are identified and integrated in the patient's plan of care  Description environment to reduce risk of injury  - Provide assistive devices as appropriate  - Consider OT/PT consult to assist with strengthening/mobility  - Encourage toileting schedule  Outcome: Progressing     Problem: COPING  Goal: Pt/Family able to verbalize co

## 2020-02-25 NOTE — PLAN OF CARE
Patient unresponsive at this time, appears comfortable. Morphine drip continued at 3ml/hr. No prn needed at this time. Family at bedside and in agreement with POC. Pt repositioned as tolerated and with family agreement. Purewick in place.        Problem: Pa opioid side effects  - Notify MD/LIP if interventions unsuccessful or patient reports new pain  - Anticipate increased pain with activity and pre-medicate as appropriate  Outcome: Progressing     Problem: SAFETY ADULT - FALL  Goal: Free from fall injury  D environmental stimuli, as able  - Instruct patient/family in relaxation techniques, as appropriate  - Assess for spiritual and psychosocial needs and initiate Spiritual Care or Behavioral Health consult as needed  Outcome: Progressing

## 2020-02-25 NOTE — CM/SW NOTE
Hospice MSW rounded earlier today to assess pt; pt not responsive to soft touch or verbal stimuli. Son, dtr and granddaughters at bedside. Dtr is tearful, family openly sharing life review and feelings are supportive of one another.  MSW provided supporti

## 2020-02-25 NOTE — PROGRESS NOTES
[facility] Hospitalist Progress Note     Reason for Admission:   PCP: Bianca Chaparro MD     Assessment/Plan:     Active Problems:    Lung cancer [facility])    Ms. Quirino Galloway is a 83 yo female with PMH of COPD, chronic respiratory failure,recent diagnosis of small cell the last 168 hours.     Invalid input(s): ALPHOS, TBIL, DBIL, TPROT    Imaging:          Meds:     • levofloxacin  750 mg Oral Q48H     • morphINE sulfate 3 mg/hr (02/24/20 3427)     ipratropium-albuterol, Normal Saline Flush, acetaminophen **OR** acetamino

## 2020-02-25 NOTE — HOSPICE RN NOTE
GIP DAY 6  Morphine drip is at 3mg/hour to manage her pain to her Fractured Ribs on the right side and to manage dyspnea  Not responsive to any verbal or tactile stimuli  NPO  PPS 10%  Lung sounds congested in all fields  POC was discussed with daughter Farnaz Houston

## 2020-02-26 NOTE — HOSPICE RN NOTE
GIP DAY 7 Morphine drip infusing at 3mg/hour to manage her dyspnea. Hot pack removed from right chest area per family request.  Mejia draining tea colored urine. Patient not awake nor responsive during visit. POC discussed with family and with Jazmín HERNANDEZ

## 2020-02-26 NOTE — CM/SW NOTE
Hospice Team rounded to see pt, multiple family members at bedside. Pt not responsive to touch or verbal stimuli, [t appears comfortable with current pain  and symptom regimen in place. MSW offered supportive presence and emotional support.  MSW will wanda

## 2020-02-26 NOTE — PROGRESS NOTES
[facility] Hospitalist Progress Note     Reason for Admission:   PCP: Sally Romeo MD     Assessment/Plan:     Active Problems:    Lung cancer [facility])    Ms. Jhoana Chaney is a 83 yo female with PMH of COPD, chronic respiratory failure,recent diagnosis of small cell TPROT    Imaging:          Meds:     • levofloxacin  750 mg Oral Q48H     • morphINE sulfate 3 mg/hr (02/26/20 9807)     ipratropium-albuterol, Normal Saline Flush, acetaminophen **OR** acetaminophen, morphINE sulfate, morphINE sulfate, haloperidol lactate

## 2020-02-26 NOTE — PLAN OF CARE
Patient status unchanged, remains unresponsive on morphine drip at 3mg/hour. Oxygen and scope patch maintained. Son at bedside through night.   Problem: Patient Centered Care  Goal: Patient preferences are identified and integrated in the patient's plan o Assess pt frequently for physical needs  - Identify cognitive and physical deficits and behaviors that affect risk of falls.   - [place] fall precautions as indicated by assessment.  - Educate pt/family on patient safety including physical limitations  -

## 2020-02-26 NOTE — HOSPICE RN NOTE
GIP DAY 7  Morphine drip is at 3mg/hour to manage her pain and dyspnea  She did not exhibit any signs of pain during my visit  NPO  PPS 10%  Oxygen 4 liters nasal cannula  POC was discussed with daughter and son at bedside. POC was discussed with Stu Esquivel

## 2020-02-26 NOTE — PLAN OF CARE
EOL care provided. Mejia inserted, large amounts of tea colored urine returned. Family at bedside.  Morphine gtt at 3  Problem: Patient Centered Care  Goal: Patient preferences are identified and integrated in the patient's plan of care  Description  Interv activity and pre-medicate as appropriate  Outcome: Progressing     Problem: SAFETY ADULT - FALL  Goal: Free from fall injury  Description  INTERVENTIONS:  - Assess pt frequently for physical needs  - Identify cognitive and physical deficits and behaviors t needs and initiate Spiritual Care or Behavioral Health consult as needed  Outcome: Progressing

## 2020-02-26 NOTE — PROGRESS NOTES
visited with patient, daughter, son. Patient was unable to respond, but was aware of us in the room.  provided emotional/spiriutal support to family. Prayed for family.  Will follow up while at [facility].     02/25/20 1934   Clinical Encounter

## 2020-02-27 NOTE — PLAN OF CARE
Problem: Patient Centered Care  Goal: Patient preferences are identified and integrated in the patient's plan of care  Description  Interventions:  - What would you like us to know as we care for you?  My family would prefer if you avoid using the word ho injury  Description  INTERVENTIONS:  - Assess pt frequently for physical needs  - Identify cognitive and physical deficits and behaviors that affect risk of falls.   - [place] fall precautions as indicated by assessment.  - Educate pt/family on patient sa

## 2020-02-27 NOTE — HOSPICE RN NOTE
Came by to assess the patient again and discussed the POC with son and daughter at bedside. Morphine drip is at 6mg/hour to manage her dyspnea/pain.

## 2020-02-27 NOTE — CM/SW NOTE
MSW rounded twice today to see pt. Pt is not responsive, family at bedside. Family continues to share in life review and is supportive of one another, family also stating they are noticing significant changes in pt decline.   Family supportive of one anoth

## 2020-02-27 NOTE — PROGRESS NOTES
[facility] Hospitalist Progress Note     Reason for Admission:   PCP: Kiah Box MD     Assessment/Plan:     Active Problems:    Lung cancer [facility])    Ms. Bo Ha is a 81 yo female with PMH of COPD, chronic respiratory failure,recent diagnosis of small cell sulfate, ipratropium-albuterol, Normal Saline Flush, acetaminophen **OR** acetaminophen, morphINE sulfate, haloperidol lactate **OR** haloperidol lactate, LORazepam **OR** LORazepam **OR** LORazepam, scopolamine, Atropine Sulfate, furosemide **OR** furosem

## 2020-02-27 NOTE — HOSPICE RN NOTE
GIP day 8. Pt unresponsive. Just received a bath by PCT. Morphine drip increased to 5mg/hr early this morning. Mejia still in place. Family at bedside. Pt appeared agitated after bath. Will allow pt to calm down/relax before reevaluating comfort level.  POc

## 2020-02-28 NOTE — PLAN OF CARE
Pt unresponsive, resting comfortably. Morphine gtt at 6ml/hr. Repositioned at families request. Mejia in place decreased output. Daughter at bedside. Oral care provided. Frequent rounding.    Problem: Patient Centered Care  Goal: Patient preferences are josselin unsuccessful or patient reports new pain  - Anticipate increased pain with activity and pre-medicate as appropriate  Outcome: Progressing     Problem: SAFETY ADULT - FALL  Goal: Free from fall injury  Description  INTERVENTIONS:  - Assess pt frequently for relaxation techniques, as appropriate  - Assess for spiritual and psychosocial needs and initiate Spiritual Care or Behavioral Health consult as needed  Outcome: Progressing

## 2020-02-28 NOTE — PROGRESS NOTES
[facility] Hospitalist Progress Note     Reason for Admission:   PCP: Aj Irizarry MD     Assessment/Plan:     Active Problems:    Lung cancer [facility])    Ms. Leigh Rodriguez is a 83 yo female with PMH of COPD, chronic respiratory failure,recent diagnosis of small cell acetaminophen, morphINE sulfate, haloperidol lactate **OR** haloperidol lactate, LORazepam **OR** LORazepam **OR** LORazepam, scopolamine, Atropine Sulfate, furosemide **OR** furosemide, glycopyrrolate, bisacodyl, ondansetron **OR** ondansetron HCl

## 2020-02-28 NOTE — HOSPICE RN NOTE
GIP DAY 9  Patient is not responsive to any verbal or tactile stimuli  Lung sounds congested in all fields  Oxygen in place 4 liters nasal cannula  Low grade fever  No signs of pain no facial grimace noted  NPO  PPS 10%  POC discussed with Jessica HERNANDEZ

## 2020-02-28 NOTE — PROGRESS NOTES
Prayed with patient at bedside until family returned. Provided emotional and spiritual care to pt son and daughter. Will continue to follow-up to support.       02/28/20 1110   Clinical Encounter Type   Visited With Family   Routine Visit Follow-up   Contin

## 2020-02-28 NOTE — PLAN OF CARE
Problem: Patient Centered Care  Goal: Patient preferences are identified and integrated in the patient's plan of care  Description  Interventions:  - What would you like us to know as we care for you?  My family would prefer if you avoid using the word ho injury  Description  INTERVENTIONS:  - Assess pt frequently for physical needs  - Identify cognitive and physical deficits and behaviors that affect risk of falls.   - [place] fall precautions as indicated by assessment.  - Educate pt/family on patient sa on inpatient hospice with [facility]. She is currently on a Morphine drip infusing at 6mg/hr for comfort. Patient unresponsive, exhibiting signs of agitation during the day requiring dose of PRN Haldol.  She was noted to have audible congestion which was

## 2020-02-28 NOTE — CM/SW NOTE
MSW rounded earlier to see pt, no family at bedside pt is not responsive to verbal or soft touch stimuli. Pt continues to show visible signs of continued decline  Chaplain Valladares at bedside. MSW remained at bedside to offers supportive presence.

## 2020-02-28 NOTE — PROGRESS NOTES
[facility]    CDI Prediction Tool Protocol (Vancomycin Prophylaxis Deferred)      This patient is currently at high risk for developing CDI due to his/her score being >/= 13 points.   The current score is: 15    Score Breakdown:  High risk antibi

## 2020-02-29 NOTE — PROGRESS NOTES
Patient  at [id number], noted to be absent of pulse and respirations. She was pronounced by writer. Patient son Pop Hui at the bedside at time of expiration, grieving appropriately.  Daughter Tamir Siddiqui and grandchildren came to the bedside later on in the [facility]

## 2020-02-29 NOTE — PLAN OF CARE
Patient appears comfortable. No PRN medication needed at this time. Morphine gtt remains at 6mg/hr. Repositioned per family request. Oral care provided. Comfort measures discussed with son at the bedside.

## 2020-02-29 NOTE — PLAN OF CARE
Patient is unresponsive, son at bedside, morphine drip continued at 6mg/hr, pt appears comfortable no PRNs given, oral care provided, culver in place.

## 2020-02-29 NOTE — PROGRESS NOTES
Patient's son Aiden Choe and Dgtr Katrina Vela present. CP provided opportunity for family to engage in life review; sharing memories of , which seemed therapeutic and healing for them.  Daughter Katrina Vela who lived with patient stated she'll need \"a lot of suppo

## 2020-02-29 NOTE — CM/SW NOTE
Pt passed [address] [facility], , MSW  [facility]  [phone]

## 2020-03-03 NOTE — DISCHARGE SUMMARY
General Medicine Discharge Summary     Patient ID:  Christopher Soto  82year old  12/26/1935    EXPIRATION NOTE    Admit date: 2/20/2020    Discharge date and time:espired 2/29/20    Attending Physician: No att. providers found     Consults: IP CONSULT TO

## 2020-03-23 ENCOUNTER — TELEPHONE (OUTPATIENT)
Dept: HEMATOLOGY/ONCOLOGY | Facility: HOSPITAL | Age: 85
End: 2020-03-23

## 2020-03-23 NOTE — TELEPHONE ENCOUNTER
[place] called saying [employer] will pay benefits for all services she had done in relation to cancer. She needs something from the doctors stating her tests and treatments were ordered due to her cancer.  Please call

## 2020-03-25 NOTE — TELEPHONE ENCOUNTER
Spoke with [employer], she states she needs a letter stating from a doctor that she had terminal cancer and was transitioned to hospice for a policy her mother opened with the [address] [place] [address]\" -- letter emailed to [employer] at [employer]. [email or web address]. [email or web address]

## 2020-08-24 RX ORDER — ALBUTEROL SULFATE 2.5 MG/3ML
SOLUTION RESPIRATORY (INHALATION)
Qty: 360 ML | Refills: 0 | OUTPATIENT
Start: 2020-08-24

## 2023-08-21 NOTE — LETTER
----- Message from Trae Esteban NP sent at 8/21/2023  9:00 AM CDT -----  Please advise,  The results from your vaginal swab showed candida (aka yeast). I will send a prescription for Terazol cream. This is safe in pregnancy     [facility]  Administration of Anesthesia  1. Macy Marin, or _________________________________ acting on her behalf, (Patient) (Dependent/Representative) request to receive anesthesia for my pending procedure/operation/treatment.   A infections, high spinal block, spinal bleeding, seizure, cardiac arrest and death. 7. AWARENESS: I understand that it is possible (but unlikely) to have explicit memory of events from the operating room while under general anesthesia.   8. ELECTROCONVULSIV unconscious pt /Relationship    My signature below affirms that prior to the time of the procedure, I have explained to the patient and/or his/her guardian, the risks and benefits of undergoing anesthesia, as well as any reasonable alternatives.     _______

## (undated) DEVICE — LINE MNTR ADLT SET O2 INTMD

## (undated) DEVICE — GAUZE SPONGES,12 PLY: Brand: CURITY

## (undated) DEVICE — CONMED SCOPE SAVER BITE BLOCK, 20X27 MM: Brand: SCOPE SAVER

## (undated) DEVICE — ENCORE® LATEX MICRO SIZE 8, STERILE LATEX POWDER-FREE SURGICAL GLOVE: Brand: ENCORE

## (undated) DEVICE — MEDI-VAC NON-CONDUCTIVE SUCTION TUBING 6MM X 1.8M (6FT.) L: Brand: CARDINAL HEALTH

## (undated) DEVICE — UNDYED BRAIDED (POLYGLACTIN 910), SYNTHETIC ABSORBABLE SUTURE: Brand: COATED VICRYL

## (undated) DEVICE — SUTURE VICRYL 3-0 SH

## (undated) DEVICE — ENCORE® LATEX ACCLAIM SIZE 8, STERILE LATEX POWDER-FREE SURGICAL GLOVE: Brand: ENCORE

## (undated) DEVICE — 35 ML SYRINGE REGULAR TIP: Brand: MONOJECT

## (undated) DEVICE — DRAPE SHEET LAPAROTOMY

## (undated) DEVICE — SOL  .9 1000ML BTL

## (undated) DEVICE — MINOR GENERAL: Brand: MEDLINE INDUSTRIES, INC.

## (undated) DEVICE — SUTURE SILK 2-0 FS

## (undated) DEVICE — Device: Brand: DEFENDO AIR/WATER/SUCTION AND BIOPSY VALVE

## (undated) DEVICE — CURVED, SMALL JAW, OPEN SEALER/DIVIDER: Brand: LIGASURE

## (undated) DEVICE — Device: Brand: CUSTOM PROCEDURE KIT

## (undated) DEVICE — DERMABOND LIQUID ADHESIVE

## (undated) DEVICE — FORCEP RADIAL JAW 4

## (undated) NOTE — Clinical Note
Dr. Isauro iYng and Charles Crocker see attached note from my visit with Krystal Munguia. Krystal Munguia and Katrina cary know to contact me with any new symptom concerns and if Krystal Munguia wishes to transition to community palliative care or hospice in the future.  I plan of f/u with David Wheeler

## (undated) NOTE — ED AVS SNAPSHOT
Julián Andres   MRN: [id number]    Department:  [facility] Emergency Department   Date of Visit:  2/18/2020           Disclosure     Insurance plans vary and the physician(s) referred by the ER may not be covered by your plan.  Please contact within the next three months to obtain basic health screening including reassessment of your blood pressure.     IF THERE IS ANY CHANGE OR WORSENING OF YOUR CONDITION, CALL YOUR PRIMARY CARE PHYSICIAN AT ONCE OR RETURN IMMEDIATELY TO THE EMERGENCY DEPARTMEN

## (undated) NOTE — LETTER
[facility] SURGERY SCHEDULING FORM   [address] [facility]   [phone] (scheduling phone) [phone] (scheduling fax)     PATIENT INFORMATION   Last Name:      Sue Heredia      First Name:    Krystal Munguia []  Regional/              []  Spinal  []  No Anesthesia   [x]  Yes  []  No or using our own   Allergies: Sulfa Antibiotics  Request early appt       Completed by:    Lili Barrientos      Date:    5/13/2019

## (undated) NOTE — LETTER
[facility] SURGERY SCHEDULING FORM   [address] [facility]   [phone] (scheduling phone) [phone] (scheduling fax)     PATIENT INFORMATION   Last Name:      Denise Fajardo      First Name:    Nelsy Olivo Allergies: Sulfa Antibiotics    **PT REQUESTING LATEST APPT**     Completed by:     Cathy JEAN BAPTISTE      Date:    9/5/2019

## (undated) NOTE — LETTER
[facility] SURGERY SCHEDULING FORM   [address] [facility]   [phone] (scheduling phone) [phone] (scheduling fax)     PATIENT INFORMATION   Last Name:      Daniella Simmons      First Name:    Tri Vazquez []  Regional/              []  Spinal  []  No Anesthesia   [x]  Yes  []  No or using our own   Allergies: Sulfa Antibiotics  **PT REQUESTS EARLY APPT IF POSSIBLE, CONTACT KENYETTA CISSE FOR SCHEDULING/INSURANCE QUESTIONS ([phone])**       Completed by: